# Patient Record
Sex: MALE | Race: OTHER | Employment: FULL TIME
[De-identification: names, ages, dates, MRNs, and addresses within clinical notes are randomized per-mention and may not be internally consistent; named-entity substitution may affect disease eponyms.]

---

## 2017-02-13 ENCOUNTER — OFFICE VISIT (OUTPATIENT)
Facility: CLINIC | Age: 47
End: 2017-02-13

## 2017-02-13 ENCOUNTER — HOSPITAL ENCOUNTER (OUTPATIENT)
Age: 47
Setting detail: SPECIMEN
Discharge: HOME | End: 2017-02-13
Payer: COMMERCIAL

## 2017-02-13 VITALS
WEIGHT: 196 LBS | DIASTOLIC BLOOD PRESSURE: 84 MMHG | SYSTOLIC BLOOD PRESSURE: 102 MMHG | RESPIRATION RATE: 16 BRPM | BODY MASS INDEX: 30.7 KG/M2 | HEART RATE: 66 BPM | OXYGEN SATURATION: 98 %

## 2017-02-13 DIAGNOSIS — Z00.00 PREVENTATIVE HEALTH CARE: ICD-10-CM

## 2017-02-13 DIAGNOSIS — M54.50 CHRONIC MIDLINE LOW BACK PAIN WITHOUT SCIATICA: ICD-10-CM

## 2017-02-13 DIAGNOSIS — G89.29 CHRONIC MIDLINE LOW BACK PAIN WITHOUT SCIATICA: ICD-10-CM

## 2017-02-13 DIAGNOSIS — E78.00 PURE HYPERCHOLESTEROLEMIA: ICD-10-CM

## 2017-02-13 DIAGNOSIS — K14.8 TONGUE DISCOLORATION: ICD-10-CM

## 2017-02-13 DIAGNOSIS — M10.9 ACUTE GOUT OF LEFT FOOT, UNSPECIFIED CAUSE: ICD-10-CM

## 2017-02-13 DIAGNOSIS — I95.9 HYPOTENSION, UNSPECIFIED HYPOTENSION TYPE: ICD-10-CM

## 2017-02-13 DIAGNOSIS — M1A.0720 CHRONIC GOUT OF LEFT ANKLE, UNSPECIFIED CAUSE: ICD-10-CM

## 2017-02-13 DIAGNOSIS — E78.00 PURE HYPERCHOLESTEROLEMIA: Primary | ICD-10-CM

## 2017-02-13 LAB
ALBUMIN SERPL-MCNC: 4.6 G/DL (ref 3.5–5.2)
ALBUMIN/GLOBULIN RATIO: 1.8 (ref 1–2.5)
ALP BLD-CCNC: 91 U/L (ref 40–129)
ALT SERPL-CCNC: 48 U/L (ref 5–41)
ANION GAP SERPL CALCULATED.3IONS-SCNC: 15 MMOL/L (ref 9–17)
AST SERPL-CCNC: 27 U/L
BILIRUB SERPL-MCNC: 0.78 MG/DL (ref 0.3–1.2)
BUN BLDV-MCNC: 10 MG/DL (ref 6–20)
BUN/CREAT BLD: ABNORMAL (ref 9–20)
CALCIUM SERPL-MCNC: 9.2 MG/DL (ref 8.6–10.4)
CHLORIDE BLD-SCNC: 100 MMOL/L (ref 98–107)
CHOLESTEROL/HDL RATIO: 3.2
CHOLESTEROL: 202 MG/DL
CO2: 26 MMOL/L (ref 20–31)
CREAT SERPL-MCNC: 0.84 MG/DL (ref 0.7–1.2)
GFR AFRICAN AMERICAN: >60 ML/MIN
GFR NON-AFRICAN AMERICAN: >60 ML/MIN
GFR SERPL CREATININE-BSD FRML MDRD: ABNORMAL ML/MIN/{1.73_M2}
GFR SERPL CREATININE-BSD FRML MDRD: ABNORMAL ML/MIN/{1.73_M2}
GLUCOSE BLD-MCNC: 97 MG/DL (ref 70–99)
HDLC SERPL-MCNC: 63 MG/DL
LDL CHOLESTEROL: 113 MG/DL (ref 0–130)
POTASSIUM SERPL-SCNC: 4.3 MMOL/L (ref 3.7–5.3)
SODIUM BLD-SCNC: 141 MMOL/L (ref 135–144)
TOTAL PROTEIN: 7.1 G/DL (ref 6.4–8.3)
TRIGL SERPL-MCNC: 128 MG/DL
URIC ACID: 7.5 MG/DL (ref 3.4–7)
VLDLC SERPL CALC-MCNC: ABNORMAL MG/DL (ref 1–30)

## 2017-02-13 PROCEDURE — 99214 OFFICE O/P EST MOD 30 MIN: CPT | Performed by: NURSE PRACTITIONER

## 2017-02-13 ASSESSMENT — ENCOUNTER SYMPTOMS
ALLERGIC/IMMUNOLOGIC NEGATIVE: 1
EYES NEGATIVE: 1
RESPIRATORY NEGATIVE: 1
GASTROINTESTINAL NEGATIVE: 1

## 2017-02-14 ENCOUNTER — TELEPHONE (OUTPATIENT)
Facility: CLINIC | Age: 47
End: 2017-02-14

## 2017-08-18 PROBLEM — E78.00 PURE HYPERCHOLESTEROLEMIA: Status: ACTIVE | Noted: 2017-08-18

## 2017-08-18 PROBLEM — M1A.0720 CHRONIC GOUT OF LEFT ANKLE: Status: ACTIVE | Noted: 2017-08-18

## 2017-08-22 ENCOUNTER — HOSPITAL ENCOUNTER (OUTPATIENT)
Age: 47
Setting detail: SPECIMEN
Discharge: HOME | End: 2017-08-22
Payer: COMMERCIAL

## 2017-08-22 DIAGNOSIS — E78.00 PURE HYPERCHOLESTEROLEMIA: ICD-10-CM

## 2017-08-22 DIAGNOSIS — M1A.0720 CHRONIC GOUT OF LEFT ANKLE, UNSPECIFIED CAUSE: ICD-10-CM

## 2017-08-22 LAB
ALBUMIN SERPL-MCNC: 4.3 G/DL (ref 3.5–5.2)
ALBUMIN/GLOBULIN RATIO: 1.8 (ref 1–2.5)
ALP BLD-CCNC: 74 U/L (ref 40–129)
ALT SERPL-CCNC: 31 U/L (ref 5–41)
ANION GAP SERPL CALCULATED.3IONS-SCNC: 13 MMOL/L (ref 9–17)
AST SERPL-CCNC: 25 U/L
BILIRUB SERPL-MCNC: 0.65 MG/DL (ref 0.3–1.2)
BUN BLDV-MCNC: 8 MG/DL (ref 6–20)
BUN/CREAT BLD: NORMAL (ref 9–20)
CALCIUM SERPL-MCNC: 8.7 MG/DL (ref 8.6–10.4)
CHLORIDE BLD-SCNC: 98 MMOL/L (ref 98–107)
CHOLESTEROL/HDL RATIO: 2.6
CHOLESTEROL: 154 MG/DL
CO2: 25 MMOL/L (ref 20–31)
CREAT SERPL-MCNC: 0.83 MG/DL (ref 0.7–1.2)
GFR AFRICAN AMERICAN: >60 ML/MIN
GFR NON-AFRICAN AMERICAN: >60 ML/MIN
GFR SERPL CREATININE-BSD FRML MDRD: NORMAL ML/MIN/{1.73_M2}
GFR SERPL CREATININE-BSD FRML MDRD: NORMAL ML/MIN/{1.73_M2}
GLUCOSE BLD-MCNC: 90 MG/DL (ref 70–99)
HDLC SERPL-MCNC: 60 MG/DL
LDL CHOLESTEROL: 69 MG/DL (ref 0–130)
POTASSIUM SERPL-SCNC: 4.4 MMOL/L (ref 3.7–5.3)
SODIUM BLD-SCNC: 136 MMOL/L (ref 135–144)
TOTAL PROTEIN: 6.7 G/DL (ref 6.4–8.3)
TRIGL SERPL-MCNC: 127 MG/DL
URIC ACID: 7.2 MG/DL (ref 3.4–7)
VLDLC SERPL CALC-MCNC: NORMAL MG/DL (ref 1–30)

## 2017-09-10 ENCOUNTER — APPOINTMENT (OUTPATIENT)
Dept: CT IMAGING | Age: 47
End: 2017-09-10
Payer: COMMERCIAL

## 2017-09-10 ENCOUNTER — HOSPITAL ENCOUNTER (OUTPATIENT)
Age: 47
Setting detail: OBSERVATION
Discharge: HOME | End: 2017-09-12
Attending: EMERGENCY MEDICINE | Admitting: SURGERY
Payer: COMMERCIAL

## 2017-09-10 ENCOUNTER — OFFICE VISIT (OUTPATIENT)
Dept: FAMILY MEDICINE CLINIC | Age: 47
End: 2017-09-10
Payer: COMMERCIAL

## 2017-09-10 VITALS
OXYGEN SATURATION: 100 % | BODY MASS INDEX: 30.01 KG/M2 | HEIGHT: 68 IN | TEMPERATURE: 98.1 F | WEIGHT: 198 LBS | DIASTOLIC BLOOD PRESSURE: 73 MMHG | SYSTOLIC BLOOD PRESSURE: 134 MMHG | HEART RATE: 76 BPM | RESPIRATION RATE: 20 BRPM

## 2017-09-10 DIAGNOSIS — S70.11XA THIGH HEMATOMA, RIGHT, INITIAL ENCOUNTER: Primary | ICD-10-CM

## 2017-09-10 DIAGNOSIS — S79.921A THIGH INJURY, RIGHT, INITIAL ENCOUNTER: Primary | ICD-10-CM

## 2017-09-10 LAB
ABSOLUTE EOS #: 0 K/UL (ref 0–0.4)
ABSOLUTE LYMPH #: 1.4 K/UL (ref 1–4.8)
ABSOLUTE MONO #: 0.9 K/UL (ref 0.1–1.3)
ANION GAP SERPL CALCULATED.3IONS-SCNC: 15 MMOL/L (ref 9–17)
BASOPHILS # BLD: 1 %
BASOPHILS ABSOLUTE: 0.1 K/UL (ref 0–0.2)
BUN BLDV-MCNC: 12 MG/DL (ref 6–20)
BUN/CREAT BLD: ABNORMAL (ref 9–20)
CALCIUM SERPL-MCNC: 8.7 MG/DL (ref 8.6–10.4)
CHLORIDE BLD-SCNC: 102 MMOL/L (ref 98–107)
CO2: 23 MMOL/L (ref 20–31)
CREAT SERPL-MCNC: 0.92 MG/DL (ref 0.7–1.2)
DIFFERENTIAL TYPE: ABNORMAL
EOSINOPHILS RELATIVE PERCENT: 0 %
GFR AFRICAN AMERICAN: >60 ML/MIN
GFR NON-AFRICAN AMERICAN: >60 ML/MIN
GFR SERPL CREATININE-BSD FRML MDRD: ABNORMAL ML/MIN/{1.73_M2}
GFR SERPL CREATININE-BSD FRML MDRD: ABNORMAL ML/MIN/{1.73_M2}
GLUCOSE BLD-MCNC: 123 MG/DL (ref 70–99)
HCT VFR BLD CALC: 36.4 % (ref 41–53)
HEMOGLOBIN: 12.1 G/DL (ref 13.5–17.5)
INR BLD: 1
LYMPHOCYTES # BLD: 12 %
MCH RBC QN AUTO: 28.6 PG (ref 26–34)
MCHC RBC AUTO-ENTMCNC: 33.2 G/DL (ref 31–37)
MCV RBC AUTO: 86 FL (ref 80–100)
MONOCYTES # BLD: 8 %
PARTIAL THROMBOPLASTIN TIME: 24.8 SEC (ref 23–31)
PDW BLD-RTO: 13.9 % (ref 11.5–14.9)
PLATELET # BLD: 210 K/UL (ref 150–450)
PLATELET ESTIMATE: ABNORMAL
PMV BLD AUTO: 8.4 FL (ref 6–12)
POTASSIUM SERPL-SCNC: 4.2 MMOL/L (ref 3.7–5.3)
PROTHROMBIN TIME: 11 SEC (ref 9.7–12)
RBC # BLD: 4.23 M/UL (ref 4.5–5.9)
RBC # BLD: ABNORMAL 10*6/UL
SEG NEUTROPHILS: 79 %
SEGMENTED NEUTROPHILS ABSOLUTE COUNT: 9.2 K/UL (ref 1.3–9.1)
SODIUM BLD-SCNC: 140 MMOL/L (ref 135–144)
WBC # BLD: 11.6 K/UL (ref 3.5–11)
WBC # BLD: ABNORMAL 10*3/UL

## 2017-09-10 PROCEDURE — G0378 HOSPITAL OBSERVATION PER HR: HCPCS

## 2017-09-10 PROCEDURE — 6370000000 HC RX 637 (ALT 250 FOR IP): Performed by: SURGERY

## 2017-09-10 PROCEDURE — 85025 COMPLETE CBC W/AUTO DIFF WBC: CPT

## 2017-09-10 PROCEDURE — 2580000003 HC RX 258: Performed by: SURGERY

## 2017-09-10 PROCEDURE — 2580000003 HC RX 258: Performed by: EMERGENCY MEDICINE

## 2017-09-10 PROCEDURE — 6360000004 HC RX CONTRAST MEDICATION: Performed by: EMERGENCY MEDICINE

## 2017-09-10 PROCEDURE — 6360000002 HC RX W HCPCS: Performed by: EMERGENCY MEDICINE

## 2017-09-10 PROCEDURE — 85730 THROMBOPLASTIN TIME PARTIAL: CPT

## 2017-09-10 PROCEDURE — 96376 TX/PRO/DX INJ SAME DRUG ADON: CPT

## 2017-09-10 PROCEDURE — 85610 PROTHROMBIN TIME: CPT

## 2017-09-10 PROCEDURE — 99213 OFFICE O/P EST LOW 20 MIN: CPT | Performed by: INTERNAL MEDICINE

## 2017-09-10 PROCEDURE — 36415 COLL VENOUS BLD VENIPUNCTURE: CPT

## 2017-09-10 PROCEDURE — 99284 EMERGENCY DEPT VISIT MOD MDM: CPT

## 2017-09-10 PROCEDURE — 80048 BASIC METABOLIC PNL TOTAL CA: CPT

## 2017-09-10 PROCEDURE — 73706 CT ANGIO LWR EXTR W/O&W/DYE: CPT

## 2017-09-10 PROCEDURE — 96374 THER/PROPH/DIAG INJ IV PUSH: CPT

## 2017-09-10 PROCEDURE — 6360000002 HC RX W HCPCS: Performed by: SURGERY

## 2017-09-10 RX ORDER — OXYCODONE HYDROCHLORIDE AND ACETAMINOPHEN 5; 325 MG/1; MG/1
1 TABLET ORAL EVERY 4 HOURS PRN
Status: DISCONTINUED | OUTPATIENT
Start: 2017-09-10 | End: 2017-09-12 | Stop reason: HOSPADM

## 2017-09-10 RX ORDER — OXYCODONE HYDROCHLORIDE AND ACETAMINOPHEN 5; 325 MG/1; MG/1
2 TABLET ORAL EVERY 4 HOURS PRN
Status: DISCONTINUED | OUTPATIENT
Start: 2017-09-10 | End: 2017-09-12 | Stop reason: HOSPADM

## 2017-09-10 RX ORDER — MORPHINE SULFATE 4 MG/ML
4 INJECTION, SOLUTION INTRAMUSCULAR; INTRAVENOUS ONCE
Status: COMPLETED | OUTPATIENT
Start: 2017-09-10 | End: 2017-09-10

## 2017-09-10 RX ORDER — SODIUM CHLORIDE 0.9 % (FLUSH) 0.9 %
10 SYRINGE (ML) INJECTION PRN
Status: DISCONTINUED | OUTPATIENT
Start: 2017-09-10 | End: 2017-09-12 | Stop reason: HOSPADM

## 2017-09-10 RX ORDER — MORPHINE SULFATE 4 MG/ML
4 INJECTION, SOLUTION INTRAMUSCULAR; INTRAVENOUS
Status: DISCONTINUED | OUTPATIENT
Start: 2017-09-10 | End: 2017-09-12 | Stop reason: HOSPADM

## 2017-09-10 RX ORDER — 0.9 % SODIUM CHLORIDE 0.9 %
100 INTRAVENOUS SOLUTION INTRAVENOUS ONCE
Status: COMPLETED | OUTPATIENT
Start: 2017-09-10 | End: 2017-09-10

## 2017-09-10 RX ORDER — ACETAMINOPHEN 325 MG/1
650 TABLET ORAL EVERY 4 HOURS PRN
Status: DISCONTINUED | OUTPATIENT
Start: 2017-09-10 | End: 2017-09-12 | Stop reason: HOSPADM

## 2017-09-10 RX ORDER — MORPHINE SULFATE 2 MG/ML
2 INJECTION, SOLUTION INTRAMUSCULAR; INTRAVENOUS
Status: DISCONTINUED | OUTPATIENT
Start: 2017-09-10 | End: 2017-09-12 | Stop reason: HOSPADM

## 2017-09-10 RX ORDER — SODIUM CHLORIDE 0.9 % (FLUSH) 0.9 %
10 SYRINGE (ML) INJECTION EVERY 12 HOURS SCHEDULED
Status: DISCONTINUED | OUTPATIENT
Start: 2017-09-10 | End: 2017-09-12 | Stop reason: HOSPADM

## 2017-09-10 RX ADMIN — OXYCODONE HYDROCHLORIDE AND ACETAMINOPHEN 1 TABLET: 5; 325 TABLET ORAL at 23:31

## 2017-09-10 RX ADMIN — IOVERSOL 100 ML: 741 INJECTION INTRA-ARTERIAL; INTRAVENOUS at 16:29

## 2017-09-10 RX ADMIN — Medication 10 ML: at 16:29

## 2017-09-10 RX ADMIN — MORPHINE SULFATE 4 MG: 4 INJECTION, SOLUTION INTRAMUSCULAR; INTRAVENOUS at 18:56

## 2017-09-10 RX ADMIN — MORPHINE SULFATE 4 MG: 4 INJECTION, SOLUTION INTRAMUSCULAR; INTRAVENOUS at 21:47

## 2017-09-10 RX ADMIN — MORPHINE SULFATE 4 MG: 4 INJECTION, SOLUTION INTRAMUSCULAR; INTRAVENOUS at 15:30

## 2017-09-10 RX ADMIN — Medication 10 ML: at 21:46

## 2017-09-10 RX ADMIN — SODIUM CHLORIDE 100 ML: 9 INJECTION, SOLUTION INTRAVENOUS at 16:29

## 2017-09-10 ASSESSMENT — ENCOUNTER SYMPTOMS
DIARRHEA: 0
VOMITING: 0
EYE REDNESS: 0
COUGH: 0
COLOR CHANGE: 0
CONSTIPATION: 0
BLOOD IN STOOL: 0
EYE PAIN: 0
WHEEZING: 0
NAUSEA: 0
RHINORRHEA: 0
COUGH: 0
CHEST TIGHTNESS: 0
SORE THROAT: 0
ABDOMINAL PAIN: 0
SINUS PRESSURE: 0
BACK PAIN: 0
SHORTNESS OF BREATH: 0
EYE DISCHARGE: 0
TROUBLE SWALLOWING: 0
FACIAL SWELLING: 0
SHORTNESS OF BREATH: 0

## 2017-09-10 ASSESSMENT — PAIN DESCRIPTION - LOCATION
LOCATION: LEG

## 2017-09-10 ASSESSMENT — PAIN SCALES - GENERAL
PAINLEVEL_OUTOF10: 8
PAINLEVEL_OUTOF10: 10
PAINLEVEL_OUTOF10: 10
PAINLEVEL_OUTOF10: 8

## 2017-09-10 ASSESSMENT — PAIN DESCRIPTION - FREQUENCY: FREQUENCY: CONTINUOUS

## 2017-09-10 ASSESSMENT — PAIN DESCRIPTION - DESCRIPTORS: DESCRIPTORS: PRESSURE;THROBBING

## 2017-09-10 ASSESSMENT — PAIN DESCRIPTION - ORIENTATION
ORIENTATION: RIGHT
ORIENTATION: LEFT

## 2017-09-10 ASSESSMENT — PAIN DESCRIPTION - PAIN TYPE
TYPE: ACUTE PAIN

## 2017-09-11 LAB
HCT VFR BLD CALC: 34.5 % (ref 41–53)
HCT VFR BLD CALC: 34.8 % (ref 41–53)
HCT VFR BLD CALC: 35.4 % (ref 41–53)
HCT VFR BLD CALC: 35.5 % (ref 41–53)
HEMOGLOBIN: 11.7 G/DL (ref 13.5–17.5)
HEMOGLOBIN: 11.8 G/DL (ref 13.5–17.5)
HEMOGLOBIN: 11.9 G/DL (ref 13.5–17.5)
HEMOGLOBIN: 11.9 G/DL (ref 13.5–17.5)

## 2017-09-11 PROCEDURE — 36415 COLL VENOUS BLD VENIPUNCTURE: CPT

## 2017-09-11 PROCEDURE — G0378 HOSPITAL OBSERVATION PER HR: HCPCS

## 2017-09-11 PROCEDURE — 85014 HEMATOCRIT: CPT

## 2017-09-11 PROCEDURE — 85018 HEMOGLOBIN: CPT

## 2017-09-11 PROCEDURE — 6370000000 HC RX 637 (ALT 250 FOR IP): Performed by: SURGERY

## 2017-09-11 PROCEDURE — 2580000003 HC RX 258: Performed by: SURGERY

## 2017-09-11 RX ADMIN — OXYCODONE HYDROCHLORIDE AND ACETAMINOPHEN 1 TABLET: 5; 325 TABLET ORAL at 13:12

## 2017-09-11 RX ADMIN — OXYCODONE HYDROCHLORIDE AND ACETAMINOPHEN 1 TABLET: 5; 325 TABLET ORAL at 09:12

## 2017-09-11 RX ADMIN — OXYCODONE HYDROCHLORIDE AND ACETAMINOPHEN 1 TABLET: 5; 325 TABLET ORAL at 20:25

## 2017-09-11 RX ADMIN — Medication 10 ML: at 20:25

## 2017-09-11 RX ADMIN — Medication 10 ML: at 09:13

## 2017-09-11 ASSESSMENT — PAIN SCALES - GENERAL
PAINLEVEL_OUTOF10: 1
PAINLEVEL_OUTOF10: 3
PAINLEVEL_OUTOF10: 2
PAINLEVEL_OUTOF10: 2
PAINLEVEL_OUTOF10: 6
PAINLEVEL_OUTOF10: 5
PAINLEVEL_OUTOF10: 6

## 2017-09-11 ASSESSMENT — PAIN DESCRIPTION - LOCATION
LOCATION: LEG

## 2017-09-11 ASSESSMENT — PAIN DESCRIPTION - DESCRIPTORS
DESCRIPTORS: ACHING;PRESSURE;THROBBING
DESCRIPTORS: ACHING;PRESSURE;THROBBING
DESCRIPTORS: ACHING;PRESSURE

## 2017-09-11 ASSESSMENT — PAIN DESCRIPTION - FREQUENCY
FREQUENCY: CONTINUOUS
FREQUENCY: CONTINUOUS

## 2017-09-11 ASSESSMENT — PAIN DESCRIPTION - PAIN TYPE
TYPE: ACUTE PAIN

## 2017-09-11 ASSESSMENT — PAIN DESCRIPTION - ORIENTATION
ORIENTATION: RIGHT

## 2017-09-12 VITALS
HEIGHT: 68 IN | WEIGHT: 201.28 LBS | TEMPERATURE: 98.2 F | HEART RATE: 91 BPM | SYSTOLIC BLOOD PRESSURE: 140 MMHG | OXYGEN SATURATION: 95 % | DIASTOLIC BLOOD PRESSURE: 76 MMHG | RESPIRATION RATE: 16 BRPM | BODY MASS INDEX: 30.51 KG/M2

## 2017-09-12 LAB
HCT VFR BLD CALC: 35.3 % (ref 41–53)
HCT VFR BLD CALC: 35.6 % (ref 41–53)
HEMOGLOBIN: 11.9 G/DL (ref 13.5–17.5)
HEMOGLOBIN: 12.1 G/DL (ref 13.5–17.5)

## 2017-09-12 PROCEDURE — 85014 HEMATOCRIT: CPT

## 2017-09-12 PROCEDURE — 6370000000 HC RX 637 (ALT 250 FOR IP): Performed by: SURGERY

## 2017-09-12 PROCEDURE — 85018 HEMOGLOBIN: CPT

## 2017-09-12 PROCEDURE — G0378 HOSPITAL OBSERVATION PER HR: HCPCS

## 2017-09-12 PROCEDURE — 2580000003 HC RX 258: Performed by: SURGERY

## 2017-09-12 RX ORDER — OXYCODONE HYDROCHLORIDE AND ACETAMINOPHEN 5; 325 MG/1; MG/1
1 TABLET ORAL EVERY 6 HOURS PRN
Qty: 30 TABLET | Refills: 0 | Status: SHIPPED | OUTPATIENT
Start: 2017-09-12 | End: 2017-09-19

## 2017-09-12 RX ADMIN — OXYCODONE HYDROCHLORIDE AND ACETAMINOPHEN 1 TABLET: 5; 325 TABLET ORAL at 06:17

## 2017-09-12 RX ADMIN — Medication 10 ML: at 10:07

## 2017-09-12 RX ADMIN — OXYCODONE HYDROCHLORIDE AND ACETAMINOPHEN 1 TABLET: 5; 325 TABLET ORAL at 12:37

## 2017-09-12 ASSESSMENT — PAIN DESCRIPTION - DESCRIPTORS: DESCRIPTORS: ACHING;PRESSURE

## 2017-09-12 ASSESSMENT — PAIN SCALES - GENERAL
PAINLEVEL_OUTOF10: 1
PAINLEVEL_OUTOF10: 4
PAINLEVEL_OUTOF10: 0
PAINLEVEL_OUTOF10: 0
PAINLEVEL_OUTOF10: 6

## 2017-09-12 ASSESSMENT — PAIN DESCRIPTION - LOCATION: LOCATION: LEG

## 2017-09-12 ASSESSMENT — PAIN DESCRIPTION - ORIENTATION: ORIENTATION: RIGHT

## 2017-09-12 ASSESSMENT — PAIN DESCRIPTION - PAIN TYPE: TYPE: ACUTE PAIN

## 2017-09-21 PROBLEM — M79.604 RIGHT LEG PAIN: Status: ACTIVE | Noted: 2017-09-21

## 2017-09-21 PROBLEM — S70.10XA THIGH HEMATOMA: Status: ACTIVE | Noted: 2017-09-21

## 2017-09-21 PROBLEM — M25.571 ACUTE RIGHT ANKLE PAIN: Status: ACTIVE | Noted: 2017-09-21

## 2018-03-26 PROBLEM — R74.8 ELEVATED LIVER ENZYMES: Status: ACTIVE | Noted: 2018-03-26

## 2018-04-02 ENCOUNTER — HOSPITAL ENCOUNTER (OUTPATIENT)
Age: 48
Setting detail: SPECIMEN
Discharge: HOME | End: 2018-04-02
Payer: COMMERCIAL

## 2018-04-02 DIAGNOSIS — R74.8 ELEVATED LIVER ENZYMES: ICD-10-CM

## 2018-04-02 DIAGNOSIS — M1A.0720 CHRONIC GOUT OF LEFT ANKLE, UNSPECIFIED CAUSE: ICD-10-CM

## 2018-04-02 DIAGNOSIS — E78.00 PURE HYPERCHOLESTEROLEMIA: ICD-10-CM

## 2018-04-02 LAB
ALBUMIN SERPL-MCNC: 4.3 G/DL (ref 3.5–5.2)
ALBUMIN/GLOBULIN RATIO: 1.7 (ref 1–2.5)
ALP BLD-CCNC: 83 U/L (ref 40–129)
ALT SERPL-CCNC: 39 U/L (ref 5–41)
ANION GAP SERPL CALCULATED.3IONS-SCNC: 10 MMOL/L (ref 9–17)
AST SERPL-CCNC: 25 U/L
BILIRUB SERPL-MCNC: 0.68 MG/DL (ref 0.3–1.2)
BUN BLDV-MCNC: 11 MG/DL (ref 6–20)
BUN/CREAT BLD: ABNORMAL (ref 9–20)
CALCIUM SERPL-MCNC: 9.2 MG/DL (ref 8.6–10.4)
CHLORIDE BLD-SCNC: 107 MMOL/L (ref 98–107)
CHOLESTEROL/HDL RATIO: 2.7
CHOLESTEROL: 178 MG/DL
CO2: 27 MMOL/L (ref 20–31)
CREAT SERPL-MCNC: 0.84 MG/DL (ref 0.7–1.2)
GFR AFRICAN AMERICAN: >60 ML/MIN
GFR NON-AFRICAN AMERICAN: >60 ML/MIN
GFR SERPL CREATININE-BSD FRML MDRD: ABNORMAL ML/MIN/{1.73_M2}
GFR SERPL CREATININE-BSD FRML MDRD: ABNORMAL ML/MIN/{1.73_M2}
GLUCOSE BLD-MCNC: 102 MG/DL (ref 70–99)
HDLC SERPL-MCNC: 67 MG/DL
LDL CHOLESTEROL: 86 MG/DL (ref 0–130)
POTASSIUM SERPL-SCNC: 4.6 MMOL/L (ref 3.7–5.3)
SODIUM BLD-SCNC: 144 MMOL/L (ref 135–144)
TOTAL PROTEIN: 6.9 G/DL (ref 6.4–8.3)
TRIGL SERPL-MCNC: 124 MG/DL
URIC ACID: 7.8 MG/DL (ref 3.4–7)
VLDLC SERPL CALC-MCNC: NORMAL MG/DL (ref 1–30)

## 2018-09-20 ENCOUNTER — HOSPITAL ENCOUNTER (OUTPATIENT)
Age: 48
Setting detail: SPECIMEN
Discharge: HOME | End: 2018-09-20
Payer: COMMERCIAL

## 2018-09-20 LAB
ALBUMIN SERPL-MCNC: 4.3 G/DL (ref 3.5–5.2)
ALBUMIN/GLOBULIN RATIO: 1.8 (ref 1–2.5)
ALP BLD-CCNC: 78 U/L (ref 40–129)
ALT SERPL-CCNC: 28 U/L (ref 5–41)
ANION GAP SERPL CALCULATED.3IONS-SCNC: 11 MMOL/L (ref 9–17)
AST SERPL-CCNC: 24 U/L
BILIRUB SERPL-MCNC: 0.65 MG/DL (ref 0.3–1.2)
BUN BLDV-MCNC: 10 MG/DL (ref 6–20)
BUN/CREAT BLD: NORMAL (ref 9–20)
CALCIUM SERPL-MCNC: 9.2 MG/DL (ref 8.6–10.4)
CHLORIDE BLD-SCNC: 103 MMOL/L (ref 98–107)
CHOLESTEROL, FASTING: 191 MG/DL
CHOLESTEROL/HDL RATIO: 2.9
CO2: 27 MMOL/L (ref 20–31)
CREAT SERPL-MCNC: 0.88 MG/DL (ref 0.7–1.2)
GFR AFRICAN AMERICAN: >60 ML/MIN
GFR NON-AFRICAN AMERICAN: >60 ML/MIN
GFR SERPL CREATININE-BSD FRML MDRD: NORMAL ML/MIN/{1.73_M2}
GFR SERPL CREATININE-BSD FRML MDRD: NORMAL ML/MIN/{1.73_M2}
GLUCOSE BLD-MCNC: 97 MG/DL (ref 70–99)
HCT VFR BLD CALC: 40.8 % (ref 40.7–50.3)
HDLC SERPL-MCNC: 66 MG/DL
HEMOGLOBIN: 13.2 G/DL (ref 13–17)
LDL CHOLESTEROL: 107 MG/DL (ref 0–130)
MCH RBC QN AUTO: 27.3 PG (ref 25.2–33.5)
MCHC RBC AUTO-ENTMCNC: 32.4 G/DL (ref 28.4–34.8)
MCV RBC AUTO: 84.5 FL (ref 82.6–102.9)
NRBC AUTOMATED: 0 PER 100 WBC
PDW BLD-RTO: 15.7 % (ref 11.8–14.4)
PLATELET # BLD: 258 K/UL (ref 138–453)
PMV BLD AUTO: 11.1 FL (ref 8.1–13.5)
POTASSIUM SERPL-SCNC: 4.8 MMOL/L (ref 3.7–5.3)
RBC # BLD: 4.83 M/UL (ref 4.21–5.77)
SODIUM BLD-SCNC: 141 MMOL/L (ref 135–144)
TOTAL PROTEIN: 6.7 G/DL (ref 6.4–8.3)
TRIGLYCERIDE, FASTING: 91 MG/DL
URIC ACID: 7.3 MG/DL (ref 3.4–7)
VLDLC SERPL CALC-MCNC: NORMAL MG/DL (ref 1–30)
WBC # BLD: 5.2 K/UL (ref 3.5–11.3)

## 2018-09-26 PROBLEM — Z00.00 PREVENTATIVE HEALTH CARE: Status: RESOLVED | Noted: 2017-02-13 | Resolved: 2018-09-26

## 2019-04-24 PROBLEM — Z12.11 SCREEN FOR COLON CANCER: Status: ACTIVE | Noted: 2019-04-24

## 2019-04-24 PROBLEM — J34.2 DEVIATED SEPTUM: Status: ACTIVE | Noted: 2019-04-24

## 2019-04-24 PROBLEM — Z13.1 SCREENING FOR DIABETES MELLITUS (DM): Status: ACTIVE | Noted: 2017-02-13

## 2019-04-24 PROBLEM — Z12.5 SCREENING FOR PROSTATE CANCER: Status: ACTIVE | Noted: 2017-02-13

## 2019-04-24 PROBLEM — J32.9 CHRONIC SINUSITIS: Status: ACTIVE | Noted: 2019-04-24

## 2019-04-25 ENCOUNTER — HOSPITAL ENCOUNTER (OUTPATIENT)
Age: 49
Setting detail: SPECIMEN
Discharge: HOME | End: 2019-04-25
Payer: COMMERCIAL

## 2019-04-25 DIAGNOSIS — Z12.5 SCREENING FOR PROSTATE CANCER: ICD-10-CM

## 2019-04-25 DIAGNOSIS — Z13.1 SCREENING FOR DIABETES MELLITUS (DM): ICD-10-CM

## 2019-04-25 DIAGNOSIS — M1A.0720 CHRONIC GOUT OF LEFT ANKLE, UNSPECIFIED CAUSE: ICD-10-CM

## 2019-04-25 DIAGNOSIS — E78.00 PURE HYPERCHOLESTEROLEMIA: ICD-10-CM

## 2019-04-25 LAB
ALBUMIN SERPL-MCNC: 4.5 G/DL (ref 3.5–5.2)
ALBUMIN/GLOBULIN RATIO: 1.9 (ref 1–2.5)
ALP BLD-CCNC: 74 U/L (ref 40–129)
ALT SERPL-CCNC: 35 U/L (ref 5–41)
ANION GAP SERPL CALCULATED.3IONS-SCNC: 15 MMOL/L (ref 9–17)
AST SERPL-CCNC: 25 U/L
BILIRUB SERPL-MCNC: 0.83 MG/DL (ref 0.3–1.2)
BUN BLDV-MCNC: 11 MG/DL (ref 6–20)
BUN/CREAT BLD: NORMAL (ref 9–20)
CALCIUM SERPL-MCNC: 9.1 MG/DL (ref 8.6–10.4)
CHLORIDE BLD-SCNC: 107 MMOL/L (ref 98–107)
CHOLESTEROL/HDL RATIO: 2.9
CHOLESTEROL: 172 MG/DL
CO2: 22 MMOL/L (ref 20–31)
CREAT SERPL-MCNC: 0.75 MG/DL (ref 0.7–1.2)
ESTIMATED AVERAGE GLUCOSE: 117 MG/DL
GFR AFRICAN AMERICAN: >60 ML/MIN
GFR NON-AFRICAN AMERICAN: >60 ML/MIN
GFR SERPL CREATININE-BSD FRML MDRD: NORMAL ML/MIN/{1.73_M2}
GFR SERPL CREATININE-BSD FRML MDRD: NORMAL ML/MIN/{1.73_M2}
GLUCOSE BLD-MCNC: 84 MG/DL (ref 70–99)
HBA1C MFR BLD: 5.7 % (ref 4–6)
HDLC SERPL-MCNC: 59 MG/DL
LDL CHOLESTEROL: 94 MG/DL (ref 0–130)
POTASSIUM SERPL-SCNC: 4.3 MMOL/L (ref 3.7–5.3)
PROSTATE SPECIFIC ANTIGEN: 3.53 UG/L
SODIUM BLD-SCNC: 144 MMOL/L (ref 135–144)
TOTAL PROTEIN: 6.9 G/DL (ref 6.4–8.3)
TRIGL SERPL-MCNC: 97 MG/DL
URIC ACID: 7.8 MG/DL (ref 3.4–7)
VLDLC SERPL CALC-MCNC: NORMAL MG/DL (ref 1–30)

## 2019-05-24 PROBLEM — Z12.5 SCREENING FOR PROSTATE CANCER: Status: RESOLVED | Noted: 2017-02-13 | Resolved: 2019-05-24

## 2019-05-24 PROBLEM — Z12.11 SCREEN FOR COLON CANCER: Status: RESOLVED | Noted: 2019-04-24 | Resolved: 2019-05-24

## 2019-07-13 ENCOUNTER — APPOINTMENT (OUTPATIENT)
Dept: GENERAL RADIOLOGY | Age: 49
End: 2019-07-13
Payer: COMMERCIAL

## 2019-07-13 ENCOUNTER — HOSPITAL ENCOUNTER (EMERGENCY)
Age: 49
Discharge: HOME | End: 2019-07-13
Attending: EMERGENCY MEDICINE
Payer: COMMERCIAL

## 2019-07-13 VITALS
SYSTOLIC BLOOD PRESSURE: 132 MMHG | BODY MASS INDEX: 28.04 KG/M2 | WEIGHT: 185 LBS | HEART RATE: 113 BPM | DIASTOLIC BLOOD PRESSURE: 88 MMHG | RESPIRATION RATE: 16 BRPM | TEMPERATURE: 97.8 F | HEIGHT: 68 IN | OXYGEN SATURATION: 97 %

## 2019-07-13 DIAGNOSIS — S63.259A DISLOCATION OF FINGER, INITIAL ENCOUNTER: ICD-10-CM
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
## 2019-07-13 PROCEDURE — 2500000003 HC RX 250 WO HCPCS: Performed by: NURSE PRACTITIONER

## 2019-07-13 PROCEDURE — 90471 IMMUNIZATION ADMIN: CPT | Performed by: NURSE PRACTITIONER

## 2019-07-13 PROCEDURE — 96372 THER/PROPH/DIAG INJ SC/IM: CPT

## 2019-07-13 PROCEDURE — 12002 RPR S/N/AX/GEN/TRNK2.6-7.5CM: CPT

## 2019-07-13 PROCEDURE — 73130 X-RAY EXAM OF HAND: CPT

## 2019-07-13 PROCEDURE — 90715 TDAP VACCINE 7 YRS/> IM: CPT | Performed by: NURSE PRACTITIONER

## 2019-07-13 PROCEDURE — 6360000002 HC RX W HCPCS: Performed by: NURSE PRACTITIONER

## 2019-07-13 PROCEDURE — 6370000000 HC RX 637 (ALT 250 FOR IP): Performed by: NURSE PRACTITIONER

## 2019-07-13 PROCEDURE — 26770 TREAT FINGER DISLOCATION: CPT

## 2019-07-13 PROCEDURE — 99283 EMERGENCY DEPT VISIT LOW MDM: CPT

## 2019-07-13 PROCEDURE — 73090 X-RAY EXAM OF FOREARM: CPT

## 2019-07-13 PROCEDURE — 12032 INTMD RPR S/A/T/EXT 2.6-7.5: CPT

## 2019-07-13 RX ORDER — LIDOCAINE HYDROCHLORIDE 10 MG/ML
20 INJECTION, SOLUTION INFILTRATION; PERINEURAL ONCE
Status: COMPLETED | OUTPATIENT
Start: 2019-07-13 | End: 2019-07-13

## 2019-07-13 RX ORDER — GINSENG 100 MG
CAPSULE ORAL
Qty: 1 TUBE | Refills: 3 | Status: SHIPPED | OUTPATIENT
Start: 2019-07-13 | End: 2019-07-23

## 2019-07-13 RX ORDER — HYDROCODONE BITARTRATE AND ACETAMINOPHEN 5; 325 MG/1; MG/1
1 TABLET ORAL EVERY 8 HOURS PRN
Qty: 10 TABLET | Refills: 0 | Status: SHIPPED | OUTPATIENT
Start: 2019-07-13 | End: 2019-07-17

## 2019-07-13 RX ORDER — MORPHINE SULFATE 4 MG/ML
4 INJECTION, SOLUTION INTRAMUSCULAR; INTRAVENOUS ONCE
Status: COMPLETED | OUTPATIENT
Start: 2019-07-13 | End: 2019-07-13

## 2019-07-13 RX ORDER — AMOXICILLIN AND CLAVULANATE POTASSIUM 875; 125 MG/1; MG/1
1 TABLET, FILM COATED ORAL ONCE
Status: COMPLETED | OUTPATIENT
Start: 2019-07-13 | End: 2019-07-13

## 2019-07-13 RX ORDER — AMOXICILLIN AND CLAVULANATE POTASSIUM 875; 125 MG/1; MG/1
1 TABLET, FILM COATED ORAL 2 TIMES DAILY
Qty: 14 TABLET | Refills: 0 | Status: SHIPPED | OUTPATIENT
Start: 2019-07-13 | End: 2019-07-19 | Stop reason: SDUPTHER

## 2019-07-13 RX ADMIN — TETANUS TOXOID, REDUCED DIPHTHERIA TOXOID AND ACELLULAR PERTUSSIS VACCINE, ADSORBED 0.5 ML: 5; 2.5; 8; 8; 2.5 SUSPENSION INTRAMUSCULAR at 11:27

## 2019-07-13 RX ADMIN — AMOXICILLIN AND CLAVULANATE POTASSIUM 1 TABLET: 875; 125 TABLET, FILM COATED ORAL at 11:28

## 2019-07-13 RX ADMIN — MORPHINE SULFATE 4 MG: 4 INJECTION INTRAVENOUS at 11:28

## 2019-07-13 RX ADMIN — LIDOCAINE HYDROCHLORIDE 20 ML: 10 INJECTION, SOLUTION INFILTRATION; PERINEURAL at 13:08

## 2019-07-13 ASSESSMENT — ENCOUNTER SYMPTOMS
NAUSEA: 0
COUGH: 0
VOMITING: 0
TROUBLE SWALLOWING: 0
SHORTNESS OF BREATH: 0

## 2019-07-13 ASSESSMENT — PAIN SCALES - GENERAL
PAINLEVEL_OUTOF10: 7
PAINLEVEL_OUTOF10: 5
PAINLEVEL_OUTOF10: 5

## 2019-07-13 NOTE — ED PROVIDER NOTES
[address]  eMERGENCYdEPARTMENT eNCOUnter      Pt Name: Arturo Tom  MRN: [id number]  [place] 1970  Date of evaluation: 7/13/2019  Provider:ALEXANDRE ALSTON CNP    CHIEF COMPLAINT       Chief Complaint   Patient presents with    Animal Bite     Dog bite to bilat arms and hands         HISTORY OF PRESENT ILLNESS  (Location/Symptom, Timing/Onset, Context/Setting, Quality, Duration, Modifying Factors, Severity.)   Arturo Tom is a 51 y.o. male who presents to the emergency department with complaints of dog bite. Patient states he was trying to break up his own dogs from fighting. States that he was bitten in both hands and forearms. Reports multiple dog bite wounds both hands and forearms. States he cannot extend his right pinky finger. His tetanus shot is not up-to-date. Relates that dogs are vaccinated. No other injuries. Nursing Notes were reviewed and I agree. REVIEW OF SYSTEMS    (2-9 systems for level 4,10 or more for level 5)      Review of Systems   Constitutional: Negative for chills and fever. HENT: Negative for trouble swallowing. Respiratory: Negative for cough and shortness of breath. Cardiovascular: Negative for chest pain and palpitations. Gastrointestinal: Negative for nausea and vomiting. Musculoskeletal:        Dog bite to both arms and hands     Except as noted above the remainder of the review of systems was reviewed andnegative. PAST MEDICAL HISTORY         Diagnosis Date    Gout 2004    Hyperlipidemia      Reviewed. SURGICAL HISTORY           Procedure Laterality Date    TONSILLECTOMY       Reviewed.   CURRENT MEDICATIONS       Discharge Medication List as of 7/13/2019  1:30 PM      CONTINUE these medications which have NOT CHANGED    Details   atorvastatin (LIPITOR) 40 MG tablet Take 1 tablet by mouth daily, Disp-30 tablet, R-11Normal      fluticasone (FLONASE) 50 MCG/ACT nasal spray 2 sprays by Nasal route daily, Disp-1 Bottle, R-1Normal Omega-3 Fatty Acids (FISH OIL PO) Take 1,200 mg by mouth             ALLERGIES     Patient has no known allergies. FAMILY HISTORY           Problem Relation Age of Onset    High Blood Pressure Mother     High Cholesterol Father     High Blood Pressure Father     Diabetes Father     Other Maternal Grandmother         gout     Cancer Maternal Grandfather     Alzheimer's Disease Paternal Grandmother     Dementia Paternal Grandmother     Diabetes Paternal Grandfather     Heart Disease Paternal Grandfather      Family Status   Relation Name Status    Mother  Alive    Father  Alive    MGM  Alive    MGF      [address]      PGF        Reviewed and not relevant. SOCIAL HISTORY      reports that he has quit smoking. He has never used smokeless tobacco. He reports that he drinks alcohol. He reports that he does not use drugs. Reviewed. PHYSICAL EXAM    (up to 7 for level 4, 8 or more for level 5)     ED Triage Vitals [19 1035]   BP Temp Temp Source Pulse Resp SpO2 Height Weight   132/88 97.8 °F (36.6 °C) Oral 113 16 97 % 5' 8\" (1.727 m) 185 lb (83.9 kg)       Physical Exam   Constitutional: He is oriented to person, place, and time. He appears well-developed and well-nourished. No distress. HENT:   Head: Normocephalic and atraumatic. Right Ear: External ear normal.   Left Ear: External ear normal.   Nose: Nose normal.   Eyes: Right eye exhibits no discharge. Left eye exhibits no discharge. No scleral icterus. Neck: Normal range of motion. No tracheal deviation present. Pulmonary/Chest: Effort normal. No stridor. No respiratory distress. Musculoskeletal: Normal range of motion. He exhibits no edema. Neurological: He is alert and oriented to person, place, and time. Coordination normal.   Skin: Skin is warm and dry. He is not diaphoretic. Dog bite wounds to dorsal and volar forearm. Multiple dog bite wounds to right hand, including fingers.   Right pinky finger in HAND 7/13/2019 11:30 am COMPARISON: None HISTORY: ORDERING SYSTEM PROVIDED HISTORY: dog bite TECHNOLOGIST PROVIDED HISTORY: dog bite Reason for Exam: dog bite Acuity: Acute Type of Exam: Initial FINDINGS: RIGHT FOREARM:  No acute fracture. Joints maintain anatomic alignment. Suspected soft tissue swelling in the posterior distal forearm. No evident skin defect, soft tissue gas, nor radiopaque foreign body. LEFT FOREARM:  No acute fracture. Joints maintain anatomic alignment. Soft tissue swelling and gas in the anterior proximal forearm. No evident skin defect nor radiopaque foreign body. RIGHT HAND:  Anterior subluxation or dislocation of the 5th middle phalanx at the 5th proximal interphalangeal joint. Joints otherwise maintain anatomic alignment. No evident acute fracture. Soft tissue swelling in the posterior distal forearm. No evident skin defect, soft tissue gas, nor radiopaque foreign body. LEFT HAND:  No acute fracture. Joints maintain anatomic alignment. No evident skin defect, soft tissue gas, nor radiopaque foreign body. 1. Anterior subluxation or dislocation of the right 5th middle phalanx with no evident associated fracture. 2. Soft tissue swelling in the posterior distal right forearm and soft tissue swelling and gas in anterior proximal left forearm likely related to the reported dog bite injuries. No underlying fractures nor radiopaque foreign bodies. 3. No acute findings in the left hand. Xr Hand Right (min 3 Views)    Result Date: 7/13/2019  EXAMINATION: 2 XRAY VIEWS OF THE RIGHT FOREARM; 2 XRAY VIEWS OF THE LEFT FOREARM; 3 XRAY VIEWS OF THE RIGHT HAND; 3 XRAY VIEWS OF THE LEFT HAND 7/13/2019 11:30 am COMPARISON: None HISTORY: ORDERING SYSTEM PROVIDED HISTORY: dog bite TECHNOLOGIST PROVIDED HISTORY: dog bite Reason for Exam: dog bite Acuity: Acute Type of Exam: Initial FINDINGS: RIGHT FOREARM:  No acute fracture. Joints maintain anatomic alignment.  Suspected soft tissue swelling TempSrc: Oral   SpO2: 97%   Weight: 185 lb (83.9 kg)   Height: 5' 8\" (1.727 m)         Vitals reviewed. PROCEDURES:  Lac Repair  Date/Time: 7/13/2019 2:57 PM  Performed by: ALEXANDRE Ellison CNP  Authorized by: Taran Arrieta MD     Consent:     Consent obtained:  Verbal    Consent given by:  Patient    Risks discussed:  Infection, pain, retained foreign body and poor wound healing    Alternatives discussed:  No treatment  Anesthesia (see MAR for exact dosages): Anesthesia method:  Local infiltration    Local anesthetic:  Lidocaine 1% w/o epi  Laceration details:     Location:  Shoulder/arm    Shoulder/arm location:  R lower arm    Length (cm):  3  Repair type:     Repair type: Intermediate  Pre-procedure details:     Preparation:  Patient was prepped and draped in usual sterile fashion and imaging obtained to evaluate for foreign bodies  Exploration:     Wound exploration: wound explored through full range of motion and entire depth of wound probed and visualized    Treatment:     Area cleansed with:  Saline and Betadine    Amount of cleaning:  Standard    Irrigation solution:  Sterile saline    Irrigation method:  Pressure wash  Skin repair:     Repair method:  Sutures    Suture size:  5-0    Suture material:  Nylon    Suture technique:  Simple interrupted    Number of sutures:  1  Approximation:     Approximation:  Loose  Post-procedure details:     Dressing:  Antibiotic ointment and non-adherent dressing    Patient tolerance of procedure: Tolerated well, no immediate complications  Lac Repair  Date/Time: 7/13/2019 2:58 PM  Performed by: ALEXANDRE Ellison CNP  Authorized by: Taran Arrieta MD     Consent:     Consent obtained:  Verbal    Consent given by:  Patient    Risks discussed:  Pain, poor wound healing, infection and retained foreign body    Alternatives discussed:  No treatment  Anesthesia (see MAR for exact dosages):      Anesthesia method:  Local infiltration    Local

## 2019-07-13 NOTE — ED PROVIDER NOTES
[address] ED  eMERGENCY dEPARTMENT eNCOUnter   Attending Attestation     Pt Name: Sofya Lamb  MRN: [id number]  [place] 1970  Date of evaluation: 7/13/19    History, EXAM, MDM:    Sofya Lamb is a 51 y.o. male who presents with Animal Bite (Dog bite to bilat arms and hands)        Vitals:   Vitals:    07/13/19 1035   BP: 132/88   Pulse: 113   Resp: 16   Temp: 97.8 °F (36.6 °C)   TempSrc: Oral   SpO2: 97%   Weight: 185 lb (83.9 kg)   Height: 5' 8\" (1.727 m)       I performed a history and physical examination of the patient and discussed management with the APC. I reviewed the APC note and agree with the documented findings and plan of care. Any areas of disagreement are noted on the chart. I was personally present for the key portions of any procedures. I have documented in the chart those procedures where I was not present during the key portions. I have personally reviewed all images and agree with the resident's interpretation. I have reviewed the emergency nurses triage note. I agree with the chief complaint, past medical history, past surgical history, allergies, medications, social and family history as documented unless otherwise noted below. Documentation of the HPI, Physical Exam and Medical Decision Making performed by medical students or scribes is based on my personal performance of the HPI, PE and MDM. For Phys Assistant/ Nurse Practitioner cases/documentation I have had a face to face evaluation of this patient and have completed at least one if not all key elements of the E/M (history, physical exam, and MDM). Additional findings are as noted.     Todd Vu MD  Attending Emergency  Physician              Todd Vu MD  07/13/19 9023       Todd Vu MD  07/13/19 0491

## 2019-07-16 ENCOUNTER — HOSPITAL ENCOUNTER (OUTPATIENT)
Dept: GENERAL RADIOLOGY | Age: 49
Discharge: HOME | End: 2019-07-18
Payer: COMMERCIAL

## 2019-07-16 ENCOUNTER — HOSPITAL ENCOUNTER (OUTPATIENT)
Age: 49
Discharge: HOME | End: 2019-07-18
Payer: COMMERCIAL

## 2019-07-16 DIAGNOSIS — T14.8XXA DISLOCATION CLOSED: ICD-10-CM

## 2019-07-16 PROCEDURE — 73140 X-RAY EXAM OF FINGER(S): CPT

## 2019-09-27 ENCOUNTER — OFFICE VISIT (OUTPATIENT)
Dept: UROLOGY | Age: 49
End: 2019-09-27
Payer: COMMERCIAL

## 2019-09-27 VITALS
HEART RATE: 76 BPM | SYSTOLIC BLOOD PRESSURE: 153 MMHG | HEIGHT: 68 IN | TEMPERATURE: 97.4 F | DIASTOLIC BLOOD PRESSURE: 86 MMHG | BODY MASS INDEX: 27.58 KG/M2 | WEIGHT: 182 LBS

## 2019-09-27 DIAGNOSIS — Z12.5 PROSTATE CANCER SCREENING: Primary | ICD-10-CM

## 2019-09-27 PROCEDURE — 99204 OFFICE O/P NEW MOD 45 MIN: CPT | Performed by: UROLOGY

## 2019-09-27 ASSESSMENT — ENCOUNTER SYMPTOMS
WHEEZING: 0
DIARRHEA: 0
BACK PAIN: 0
ABDOMINAL PAIN: 0
SHORTNESS OF BREATH: 0
VOMITING: 0
EYE PAIN: 0
NAUSEA: 0
EYE REDNESS: 0
CONSTIPATION: 0
COUGH: 0

## 2019-09-27 NOTE — PROGRESS NOTES
[facility]  [facility] - [address]  Dept: [phone]  Dept Fax: [phone] [facility] Urology Office Note - New patient    Patient:  Denisse De Leon  YOB: 1970  Date: 9/27/2019    The patient is a 47 y.o. male who presents todayfor evaluation of the following problems:   Chief Complaint   Patient presents with    New Patient     prostate exam     referred by ALEXANDRE Rosenberg CNP. HPI  He is here for prostate cancer screening. He has minimal voiding complaints. He has no family history of prostate cancer. He had an issue voiding when he was in high school. He feels like he has a weak stream.  He used to bike, but does not anymore. No hematuria, no dysuria. He was found to have a PSA of 3.53. He does not restrict fluids before. (Patient's old records have been requested, reviewed and summarized in today's note.)    Summary of old records: N/A    Additional History: N/A    Procedures Today: N/A    Last several PSA's:  Lab Results   Component Value Date    PSA 3.53 04/25/2019     Last total testosterone:  No results found for: TESTOSTERONE  Urinalysis today:  No results found for this visit on 09/27/19.     AUA Symptom Score (9/27/2019):  INCOMPLETE EMPTYING: How often have you had the sensation of not emptying your bladder?: Not at all  FREQUENCY: How often do you have to urinate less than every two hours?: Not at all  INTERMITTENCY: How often have you found you stopped and started again several times when you urinated?: Not at all  URGENCY: How often have you found it difficult to postpone urination?: Not at all  WEAK STREAM: How often have you had a weak urinary stream?: Not at all  STRAINING: How often have you had to strain to start  urination?: Not at all  NOCTURIA: How many times did you typically get up at night to uriniate?: 1 Time  TOTAL I-PSS SCORE[de-identified] 1       Last BUN and creatinine:  Lab Results normal  Cardiovascular: Warm & Pink  Abdomen: Soft, non-tender, non-distendedwith no CVA,  No flank tenderness,  Orhepatosplenomegaly   Lymphatics: No palpable lymphadenopathy. Bladder non-tender and not distended. Musculoskeletal: Normal gait and station  Penis normal and circumcised  Urethral meatus normal  Scrotal exam normal  Testicles normal bilaterally  Epididymis normal bilaterally  No evidence of inguinal hernia  Normal rectal tone with no masses  Prostate:  40 grams, smooth, no nodules. Assessment and Plan      1. Prostate cancer screening           Plan:        Doing well  No voiding complaints. PSA is slightly elevated for someone his age. DEMIAN shows BPH. Would re-check PSA in 6 weeks. Prescriptions Ordered:  No orders of the defined types were placed in this encounter. Orders Placed:  Orders Placed This Encounter   Procedures    PSA, Diagnostic     Standing Status:   Future     Standing Expiration Date:   9/26/2020             Yimi Matamoros MD    Agree with the ROS entered by the MA.

## 2019-10-24 ENCOUNTER — HOSPITAL ENCOUNTER (OUTPATIENT)
Age: 49
Setting detail: SPECIMEN
Discharge: HOME | End: 2019-10-24
Payer: COMMERCIAL

## 2019-10-24 DIAGNOSIS — Z12.5 PROSTATE CANCER SCREENING: ICD-10-CM

## 2019-10-24 LAB — PROSTATE SPECIFIC ANTIGEN: 2.18 UG/L

## 2019-11-05 ENCOUNTER — TELEPHONE (OUTPATIENT)
Dept: UROLOGY | Age: 49
End: 2019-11-05

## 2019-11-21 ENCOUNTER — OFFICE VISIT (OUTPATIENT)
Dept: UROLOGY | Age: 49
End: 2019-11-21
Payer: COMMERCIAL

## 2019-11-21 VITALS
TEMPERATURE: 98.3 F | SYSTOLIC BLOOD PRESSURE: 110 MMHG | BODY MASS INDEX: 28.92 KG/M2 | DIASTOLIC BLOOD PRESSURE: 80 MMHG | WEIGHT: 190.8 LBS | HEIGHT: 68 IN

## 2019-11-21 DIAGNOSIS — Z12.5 PROSTATE CANCER SCREENING: Primary | ICD-10-CM

## 2019-11-21 PROCEDURE — 99212 OFFICE O/P EST SF 10 MIN: CPT | Performed by: UROLOGY

## 2019-11-21 ASSESSMENT — ENCOUNTER SYMPTOMS
NAUSEA: 0
SHORTNESS OF BREATH: 0
ABDOMINAL PAIN: 0
CONSTIPATION: 0
DIARRHEA: 0
EYE REDNESS: 0
COUGH: 0
BACK PAIN: 0
WHEEZING: 0
EYE PAIN: 0
VOMITING: 0

## 2020-04-24 ENCOUNTER — TELEPHONE (OUTPATIENT)
Dept: FAMILY MEDICINE CLINIC | Age: 50
End: 2020-04-24

## 2020-05-20 ENCOUNTER — HOSPITAL ENCOUNTER (OUTPATIENT)
Age: 50
Setting detail: SPECIMEN
Discharge: HOME | End: 2020-05-20
Payer: COMMERCIAL

## 2020-05-20 LAB
CHOLESTEROL/HDL RATIO: 2.7
CHOLESTEROL: 114 MG/DL
HDLC SERPL-MCNC: 43 MG/DL
LDL CHOLESTEROL: 57 MG/DL (ref 0–130)
PROSTATE SPECIFIC ANTIGEN: 3.52 UG/L
TRIGL SERPL-MCNC: 69 MG/DL
VLDLC SERPL CALC-MCNC: NORMAL MG/DL (ref 1–30)

## 2020-05-21 LAB
ESTIMATED AVERAGE GLUCOSE: 117 MG/DL
HBA1C MFR BLD: 5.7 % (ref 4–6)

## 2020-05-26 ENCOUNTER — VIRTUAL VISIT (OUTPATIENT)
Dept: UROLOGY | Age: 50
End: 2020-05-26
Payer: COMMERCIAL

## 2020-05-26 PROCEDURE — 99213 OFFICE O/P EST LOW 20 MIN: CPT | Performed by: UROLOGY

## 2020-05-26 ASSESSMENT — ENCOUNTER SYMPTOMS
NAUSEA: 0
EYE PAIN: 0
COUGH: 0
ABDOMINAL PAIN: 0
VOMITING: 0
WHEEZING: 0
BACK PAIN: 0
EYE REDNESS: 0
SHORTNESS OF BREATH: 0
COLOR CHANGE: 0

## 2020-05-26 NOTE — PROGRESS NOTES
[facility]  [facility] - [facility]  [address] [facility] [address]  Dept:  [facility] Urology Office Note - Established    Patient:  Cheo Sotomayor  YOB: 1970  Date: 5/26/2020    The patient is a 47 y.o. male who presents todayfor evaluation of the following problems:   Chief Complaint   Patient presents with    Elevated PSA     6 mo f/u with PSA       HPI  He is following up for elevated PSA. He had a PSA of 3.5. It then came back down to 2.18, and now it is back up to 3.5. He is voiding well without any issues. No family history of prostate cancer. His dad had an elevated PSA, which resulted in a biopsy and that was negative. He has a mildly enlarged gland for his age. He has no voiding complaints at this time. Summary of old records: N/A    Additional History: N/A    Procedures Today: N/A    Urinalysis today:  No results found for this visit on 05/26/20.   Last several PSA's:  Lab Results   Component Value Date    PSA 3.52 05/20/2020    PSA 2.18 10/24/2019    PSA 3.53 04/25/2019     Last total testosterone:  No results found for: TESTOSTERONE    AUA Symptom Score (5/26/2020):                               Last BUN and creatinine:  Lab Results   Component Value Date    BUN 11 04/25/2019     Lab Results   Component Value Date    CREATININE 0.75 04/25/2019       Additional Lab/Culture results: none    Imaging Reviewed during this Office Visit: none    (results were independently reviewed by physician and radiology report verified)    PAST MEDICAL, FAMILY AND SOCIAL HISTORY UPDATE:  Past Medical History:   Diagnosis Date    Gout 2004    Hyperlipidemia      Past Surgical History:   Procedure Laterality Date    TONSILLECTOMY       Family History   Problem Relation Age of Onset    High Blood Pressure Mother     High Cholesterol Father     High Blood Pressure Father     Diabetes Father     Other Maternal Grandmother         gout     Cancer Maternal Grandfather     Alzheimer's Disease Paternal Grandmother     Dementia Paternal Grandmother     Diabetes Paternal Grandfather     Heart Disease Paternal Grandfather      Outpatient Medications Marked as Taking for the 5/26/20 encounter (Virtual Visit) with Carlos Wellington MD   Medication Sig Dispense Refill    atorvastatin (LIPITOR) 40 MG tablet Take 1 tablet by mouth daily 30 tablet 11       Patient has no known allergies. Social History     Tobacco Use   Smoking Status Former Smoker   Smokeless Tobacco Never Used     (Ifpatient a smoker, smoking cessation counseling offered)    Social History     Substance and Sexual Activity   Alcohol Use Yes    Comment: weekends       REVIEW OF SYSTEMS:  Review of Systems    Physical Exam:    There were no vitals filed for this visit. There is no height or weight on file to calculate BMI. Patient is a 47 y.o. male in no acute distress and alert and oriented to person, place and time. Physical Exam  Constitutional: Patient in no acute distress. Neuro: Alert and oriented to person, place and time. Psych: Mood normal, affect normal  Skin: No rash noted  HEENT: Head: Normocephalic andatraumatic    Lungs: Respiratory effort is normal  Cardiovascular: Warm & Pink  Abdomen: Soft, non-tender, non-distended with no CVA,  No flank tenderness,  Or hepatosplenomegaly   Lymphatics: No palpablelymphadenopathy. Assessment and Plan      1. Prostate cancer screening    2. BPH without obstruction/lower urinary tract symptoms           Plan:          No voiding complaints. PSA has gone back up. Discussed biopsy vs observation. Will follow with PSA in 6 months. Return in about 6 months (around 11/26/2020). Prescriptions Ordered:  No orders of the defined types were placed in this encounter. Orders Placed:  No orders of the defined types were placed in this encounter. Carlos Wellington MD    Agree with the ROS entered by the MA.

## 2020-11-20 ENCOUNTER — HOSPITAL ENCOUNTER (OUTPATIENT)
Age: 50
Setting detail: SPECIMEN
Discharge: HOME | End: 2020-11-20
Payer: COMMERCIAL

## 2020-11-20 LAB — PROSTATE SPECIFIC ANTIGEN: 1.25 UG/L

## 2020-11-22 ENCOUNTER — OFFICE VISIT (OUTPATIENT)
Dept: PRIMARY CARE CLINIC | Age: 50
End: 2020-11-22
Payer: COMMERCIAL

## 2020-11-22 ENCOUNTER — HOSPITAL ENCOUNTER (OUTPATIENT)
Age: 50
Setting detail: SPECIMEN
Discharge: HOME | End: 2020-11-22
Payer: COMMERCIAL

## 2020-11-22 VITALS
TEMPERATURE: 99.1 F | HEART RATE: 97 BPM | OXYGEN SATURATION: 97 % | BODY MASS INDEX: 28.79 KG/M2 | HEIGHT: 68 IN | WEIGHT: 190 LBS

## 2020-11-22 PROCEDURE — 99213 OFFICE O/P EST LOW 20 MIN: CPT | Performed by: FAMILY MEDICINE

## 2020-11-22 ASSESSMENT — PATIENT HEALTH QUESTIONNAIRE - PHQ9
1. LITTLE INTEREST OR PLEASURE IN DOING THINGS: 0
2. FEELING DOWN, DEPRESSED OR HOPELESS: 0
SUM OF ALL RESPONSES TO PHQ QUESTIONS 1-9: 0
SUM OF ALL RESPONSES TO PHQ9 QUESTIONS 1 & 2: 0
SUM OF ALL RESPONSES TO PHQ QUESTIONS 1-9: 0
SUM OF ALL RESPONSES TO PHQ QUESTIONS 1-9: 0

## 2020-11-22 ASSESSMENT — ENCOUNTER SYMPTOMS
SORE THROAT: 1
ABDOMINAL PAIN: 0
COUGH: 1
WHEEZING: 0
NAUSEA: 0
VOMITING: 0
RHINORRHEA: 1
SHORTNESS OF BREATH: 0
DIARRHEA: 0

## 2020-11-22 NOTE — PATIENT INSTRUCTIONS
You were tested for COVID today. We will call you with results when they are available. If you have not heard from us in 7 days, please call our office. Advance Care Planning  People with COVID-19 may have no symptoms, mild symptoms, such as fever, cough, and shortness of breath or they may have more severe illness, developing severe and fatal pneumonia. As a result, Advance Care Planning with attention to naming a health care decision maker (someone you trust to make healthcare decisions for you if you could not speak for yourself) and sharing other health care preferences is important BEFORE a possible health crisis. Please contact your Primary Care Provider to discuss Advance Care Planning. Preventing the Spread of Coronavirus Disease 2019 in Homes and Residential Communities  For the most recent information go to [email or web address]    Prevention steps for People with confirmed or suspected COVID-19 (including persons under investigation) who do not need to be hospitalized  and   People with confirmed COVID-19 who were hospitalized and determined to be medically stable to go home    Your healthcare provider and public health staff will evaluate whether you can be cared for at home. If it is determined that you do not need to be hospitalized and can be isolated at home, you will be monitored by staff from your local or state health department. You should follow the prevention steps below until a healthcare provider or local or state health department says you can return to your normal activities. Stay home except to get medical care  People who are mildly ill with COVID-19 are able to isolate at home during their illness. You should restrict activities outside your home, except for getting medical care. Do not go to work, school, or public areas. Avoid using public transportation, ride-sharing, or taxis.   Separate yourself from other people and local health department or occupational health professionals, as appropriate. When working with your local health department check their available hours. If you have a medical emergency and need to call 911, notify the dispatch personnel that you have, or are being evaluated for COVID-19. If possible, put on a facemask before emergency medical services arrive. Discontinuing home isolation  Patients with confirmed COVID-19 should remain under home isolation precautions until the risk of secondary transmission to others is thought to be low. The decision to discontinue home isolation precautions should be made on a case-by-case basis, in consultation with healthcare providers and state and local health departments.

## 2020-11-22 NOTE — PROGRESS NOTES
[address]  [address] [address]  Dept: [phone]  Dept Fax: [phone]    Sara Denver is a 45 y.o. male who presents today for his medical conditions/complaintsas noted below. Sara Denver is c/o of Concern For COVID-19 (cough, drainage, loss of taste and sore throat,X2 DAYS, POSITIVE EXPOSURE TO 2 FRIENDS)        HPI:     Cough   This is a new problem. The current episode started in the past 7 days (2 days). The problem has been unchanged. The problem occurs constantly. The cough is non-productive. Associated symptoms include ear pain, nasal congestion, rhinorrhea and a sore throat. Pertinent negatives include no chills, fever, myalgias, rash, shortness of breath or wheezing. Associated symptoms comments: Loss of taste. Nothing aggravates the symptoms. Treatments tried: just finished amoxicillin which he was taking for ear infection. The treatment provided mild relief. There is no history of asthma, COPD or environmental allergies.    No significant PMHx  Reports exposure to people who later tested positive for COVID about 1 week ago  Past Medical History:   Diagnosis Date    Gout 2004    Hyperlipidemia     Past medical history reviewed and pertinent positives/negatives in the HPI    Past Surgical History:   Procedure Laterality Date    TONSILLECTOMY         Family History   Problem Relation Age of Onset    High Blood Pressure Mother     High Cholesterol Father     High Blood Pressure Father     Diabetes Father     Other Maternal Grandmother         gout     Cancer Maternal Grandfather     Alzheimer's Disease Paternal Grandmother     Dementia Paternal Grandmother     Diabetes Paternal Grandfather     Heart Disease Paternal Grandfather        Social History     Tobacco Use    Smoking status: Former Smoker    Smokeless tobacco: Never Used   Substance Use Topics    Alcohol use: Yes     Comment: weekends      Current Outpatient Medications   Medication Sig Dispense Refill    ibuprofen (ADVIL;MOTRIN) 800 MG tablet Take 1 tablet by mouth every 8 hours as needed for Pain 30 tablet 0    atorvastatin (LIPITOR) 40 MG tablet Take 1 tablet by mouth daily 30 tablet 11    predniSONE (DELTASONE) 20 MG tablet I po bid x 4 then I po daily x 3 days (Patient not taking: Reported on 11/17/2020) 11 tablet 0     No current facility-administered medications for this visit. No Known Allergies    Health Maintenance   Topic Date Due    Shingles Vaccine (1 of 2) 07/23/2019    Colon cancer screen colonoscopy  07/23/2019    Flu vaccine (1) 09/01/2020    HIV screen  08/08/2026 (Originally 7/23/1984)    A1C test (Diabetic or Prediabetic)  05/20/2021    Lipid screen  05/20/2021    DTaP/Tdap/Td vaccine (2 - Td) 07/13/2029    Hepatitis A vaccine  Aged Out    Hepatitis B vaccine  Aged Out    Hib vaccine  Aged Out    Meningococcal (ACWY) vaccine  Aged Out    Pneumococcal 0-64 years Vaccine  Aged Out       :      Review of Systems   Constitutional: Negative for chills and fever. HENT: Positive for congestion, ear pain, rhinorrhea and sore throat. Respiratory: Positive for cough. Negative for shortness of breath and wheezing. Gastrointestinal: Negative for abdominal pain, diarrhea, nausea and vomiting. Musculoskeletal: Negative for myalgias. Skin: Negative for pallor and rash. Allergic/Immunologic: Negative for environmental allergies. Hematological: Negative for adenopathy. Objective:     Physical Exam  Vitals signs and nursing note reviewed. Constitutional:       Appearance: Normal appearance. HENT:      Head: Normocephalic and atraumatic. Right Ear: Hearing, ear canal and external ear normal. A middle ear effusion is present. Left Ear: Hearing, tympanic membrane, ear canal and external ear normal.      Nose: Nose normal.      Mouth/Throat:      Lips: Pink.       Mouth: Mucous membranes are moist.      Pharynx: Posterior oropharyngeal erythema present. No pharyngeal swelling. Tonsils: No tonsillar exudate. Eyes:      Extraocular Movements: Extraocular movements intact. Conjunctiva/sclera: Conjunctivae normal.   Neck:      Musculoskeletal: Normal range of motion. Cardiovascular:      Rate and Rhythm: Normal rate and regular rhythm. Heart sounds: Normal heart sounds. Pulmonary:      Effort: Pulmonary effort is normal.      Breath sounds: Normal breath sounds. Skin:     General: Skin is warm and dry. Neurological:      Mental Status: He is alert and oriented to person, place, and time. Mental status is at baseline. Psychiatric:         Mood and Affect: Mood normal.         Behavior: Behavior normal.         Thought Content: Thought content normal.         Judgment: Judgment normal.       Pulse 97   Temp 99.1 °F (37.3 °C) (Infrared)   Ht 5' 8\" (1.727 m)   Wt 190 lb (86.2 kg)   SpO2 97%   BMI 28.89 kg/m²     Assessment:       Diagnosis Orders   1. Suspected COVID-19 virus infection  COVID-19 Ambulatory   2. Viral illness         Plan: You were tested for COVID today. We will call you with results when they are available. If you have not heard from us in 7 days, please call our office. Advance Care Planning  People with COVID-19 may have no symptoms, mild symptoms, such as fever, cough, and shortness of breath or they may have more severe illness, developing severe and fatal pneumonia. As a result, Advance Care Planning with attention to naming a health care decision maker (someone you trust to make healthcare decisions for you if you could not speak for yourself) and sharing other health care preferences is important BEFORE a possible health crisis. Please contact your Primary Care Provider to discuss Advance Care Planning.     Preventing the Spread of Coronavirus Disease 2019 in Homes and Residential Communities  For the most recent information go to [employer]    Prevention steps for People with confirmed or suspected COVID-19 (including persons under investigation) who do not need to be hospitalized  and   People with confirmed COVID-19 who were hospitalized and determined to be medically stable to go home    Your healthcare provider and public health staff will evaluate whether you can be cared for at home. If it is determined that you do not need to be hospitalized and can be isolated at home, you will be monitored by staff from your local or state health department. You should follow the prevention steps below until a healthcare provider or local or state health department says you can return to your normal activities. Stay home except to get medical care  People who are mildly ill with COVID-19 are able to isolate at home during their illness. You should restrict activities outside your home, except for getting medical care. Do not go to work, school, or public areas. Avoid using public transportation, ride-sharing, or taxis. Separate yourself from other people and animals in your home  People: As much as possible, you should stay in a specific room and away from other people in your home. Also, you should use a separate bathroom, if available. Animals: You should restrict contact with pets and other animals while you are sick with COVID-19, just like you would around other people. Although there have not been reports of pets or other animals becoming sick with COVID-19, it is still recommended that people sick with COVID-19 limit contact with animals until more information is known about the virus. When possible, have another member of your household care for your animals while you are sick. If you are sick with COVID-19, avoid contact with your pet, including petting, snuggling, being kissed or licked, and sharing food.  If you must care for your pet or be around animals while you are sick, wash your hands before and after you interact with pets and wear a facemask. Call ahead before visiting your doctor  If you have a medical appointment, call the healthcare provider and tell them that you have or may have COVID-19. This will help the healthcare providers office take steps to keep other people from getting infected or exposed. Wear a facemask  You should wear a facemask when you are around other people (e.g., sharing a room or vehicle) or pets and before you enter a healthcare providers office. If you are not able to wear a facemask (for example, because it causes trouble breathing), then people who live with you should not stay in the same room with you, or they should wear a facemask if they enter your room. Cover your coughs and sneezes  Cover your mouth and nose with a tissue when you cough or sneeze. Throw used tissues in a lined trash can. Immediately wash your hands with soap and water for at least 20 seconds or, if soap and water are not available, clean your hands with an alcohol-based hand  that contains at least 60% alcohol. Clean your hands often  Wash your hands often with soap and water for at least 20 seconds, especially after blowing your nose, coughing, or sneezing; going to the bathroom; and before eating or preparing food. If soap and water are not readily available, use an alcohol-based hand  with at least 60% alcohol, covering all surfaces of your hands and rubbing them together until they feel dry. Soap and water are the best option if hands are visibly dirty. Avoid touching your eyes, nose, and mouth with unwashed hands. Avoid sharing personal household items  You should not share dishes, drinking glasses, cups, eating utensils, towels, or bedding with other people or pets in your home. After using these items, they should be washed thoroughly with soap and water.   Clean all high-touch surfaces everyday  High touch surfaces include counters, tabletops, doorknobs, bathroom fixtures, toilets, phones, keyboards, tablets, and bedside tables. Also, clean any surfaces that may have blood, stool, or body fluids on them. Use a household cleaning spray or wipe, according to the label instructions. Labels contain instructions for safe and effective use of the cleaning product including precautions you should take when applying the product, such as wearing gloves and making sure you have good ventilation during use of the product. Monitor your symptoms  Seek prompt medical attention if your illness is worsening (e.g., difficulty breathing). Before seeking care, call your healthcare provider and tell them that you have, or are being evaluated for, COVID-19. Put on a facemask before you enter the facility. These steps will help the healthcare providers office to keep other people in the office or waiting room from getting infected or exposed. Ask your healthcare provider to call the local or state health department. Persons who are placed under active monitoring or facilitated self-monitoring should follow instructions provided by their local health department or occupational health professionals, as appropriate. When working with your local health department check their available hours. If you have a medical emergency and need to call 911, notify the dispatch personnel that you have, or are being evaluated for COVID-19. If possible, put on a facemask before emergency medical services arrive. Discontinuing home isolation  Patients with confirmed COVID-19 should remain under home isolation precautions until the risk of secondary transmission to others is thought to be low. The decision to discontinue home isolation precautions should be made on a case-by-case basis, in consultation with healthcare providers and state and local health departments.           Orders Placed This Encounter   Procedures    COVID-19 Ambulatory     Oropharyngeal     Standing Status:

## 2020-11-25 LAB — SARS-COV-2, NAA: DETECTED

## 2020-11-26 ENCOUNTER — TELEPHONE (OUTPATIENT)
Dept: FAMILY MEDICINE CLINIC | Age: 50
End: 2020-11-26

## 2020-11-26 NOTE — TELEPHONE ENCOUNTER
LM for patient with positive results and to remain quarantined for 10 days from the start of symptoms. Advise to call 911 or go to ER for any worsening SOB or trouble breathing.  Advised that the health department will be in contact with them

## 2020-11-27 ENCOUNTER — TELEPHONE (OUTPATIENT)
Dept: UROLOGY | Age: 50
End: 2020-11-27

## 2021-07-21 ENCOUNTER — HOSPITAL ENCOUNTER (OUTPATIENT)
Age: 51
Setting detail: SPECIMEN
Discharge: HOME | End: 2021-07-21
Payer: COMMERCIAL

## 2021-07-21 DIAGNOSIS — E78.00 PURE HYPERCHOLESTEROLEMIA: ICD-10-CM

## 2021-07-21 DIAGNOSIS — R74.8 ELEVATED LIVER ENZYMES: ICD-10-CM

## 2021-07-21 LAB
HCT VFR BLD CALC: 42.4 % (ref 40.7–50.3)
HEMOGLOBIN: 12.4 G/DL (ref 13–17)
MCH RBC QN AUTO: 21.3 PG (ref 25.2–33.5)
MCHC RBC AUTO-ENTMCNC: 29.2 G/DL (ref 28.4–34.8)
MCV RBC AUTO: 73 FL (ref 82.6–102.9)
NRBC AUTOMATED: 0 PER 100 WBC
PDW BLD-RTO: 21.2 % (ref 11.8–14.4)
PLATELET # BLD: 394 K/UL (ref 138–453)
PMV BLD AUTO: 10.2 FL (ref 8.1–13.5)
RBC # BLD: 5.81 M/UL (ref 4.21–5.77)
WBC # BLD: 7.4 K/UL (ref 3.5–11.3)

## 2021-07-22 LAB
ALBUMIN SERPL-MCNC: 4.3 G/DL (ref 3.5–5.2)
ALBUMIN/GLOBULIN RATIO: 1.6 (ref 1–2.5)
ALP BLD-CCNC: 82 U/L (ref 40–129)
ALT SERPL-CCNC: 34 U/L (ref 5–41)
ANION GAP SERPL CALCULATED.3IONS-SCNC: 17 MMOL/L (ref 9–17)
AST SERPL-CCNC: 27 U/L
BILIRUB SERPL-MCNC: 0.5 MG/DL (ref 0.3–1.2)
BUN BLDV-MCNC: 8 MG/DL (ref 6–20)
BUN/CREAT BLD: NORMAL (ref 9–20)
CALCIUM SERPL-MCNC: 9.1 MG/DL (ref 8.6–10.4)
CHLORIDE BLD-SCNC: 104 MMOL/L (ref 98–107)
CHOLESTEROL/HDL RATIO: 2.4
CHOLESTEROL: 114 MG/DL
CO2: 21 MMOL/L (ref 20–31)
CREAT SERPL-MCNC: 1.1 MG/DL (ref 0.7–1.2)
GFR AFRICAN AMERICAN: >60 ML/MIN
GFR NON-AFRICAN AMERICAN: >60 ML/MIN
GFR SERPL CREATININE-BSD FRML MDRD: NORMAL ML/MIN/{1.73_M2}
GFR SERPL CREATININE-BSD FRML MDRD: NORMAL ML/MIN/{1.73_M2}
GLUCOSE BLD-MCNC: 89 MG/DL (ref 70–99)
HDLC SERPL-MCNC: 47 MG/DL
LDL CHOLESTEROL: 54 MG/DL (ref 0–130)
POTASSIUM SERPL-SCNC: 5 MMOL/L (ref 3.7–5.3)
SODIUM BLD-SCNC: 142 MMOL/L (ref 135–144)
TOTAL PROTEIN: 7 G/DL (ref 6.4–8.3)
TRIGL SERPL-MCNC: 66 MG/DL
VLDLC SERPL CALC-MCNC: NORMAL MG/DL (ref 1–30)

## 2021-11-17 ENCOUNTER — NURSE TRIAGE (OUTPATIENT)
Dept: OTHER | Facility: CLINIC | Age: 51
End: 2021-11-17

## 2021-11-17 NOTE — TELEPHONE ENCOUNTER
Reason for Disposition   MILD pain that comes and goes (cramps) lasts > 24 hours    Answer Assessment - Initial Assessment Questions  1. LOCATION: \"Where does it hurt? \"       Lower abdominal pain     2. RADIATION: \"Does the pain shoot anywhere else? \" (e.g., chest, back)      Sometimes in testicles and end of penis. 3. ONSET: \"When did the pain begin? \" (Minutes, hours or days ago)       Yesterday around 12:00     4. SUDDEN: \"Gradual or sudden onset? \"       Sudden sharp pains    5. PATTERN \"Does the pain come and go, or is it constant? \"     - If constant: \"Is it getting better, staying the same, or worsening? \"       (Note: Constant means the pain never goes away completely; most serious pain is constant and it progresses)      - If intermittent: \"How long does it last?\" \"Do you have pain now? \"      (Note: Intermittent means the pain goes away completely between bouts)      Comes and goes, pain level fluctuates. 6. SEVERITY: \"How bad is the pain? \"  (e.g., Scale 1-10; mild, moderate, or severe)     - MILD (1-3): doesn't interfere with normal activities, abdomen soft and not tender to touch      - MODERATE (4-7): interferes with normal activities or awakens from sleep, tender to touch      - SEVERE (8-10): excruciating pain, doubled over, unable to do any normal activities        6/10, feels like he needs to have a BM. Last BM 30 minutes go, possible constipation     7. RECURRENT SYMPTOM: \"Have you ever had this type of abdominal pain before? \" If so, ask: \"When was the last time? \" and \"What happened that time? \"       Yes, 2-3 months ago. 8. CAUSE: \"What do you think is causing the abdominal pain? \"      Urinary frequency    9. RELIEVING/AGGRAVATING FACTORS: \"What makes it better or worse? \" (e.g., movement, antacids, bowel movement)      Nothing helps. 10. OTHER SYMPTOMS: \"Has there been any vomiting, diarrhea, constipation, or urine problems? \"        Possible constipation, states he does have blood in bowel movements at times and has talked to the MD about this. Protocols used: ABDOMINAL PAIN - MALE-ADULT-OH    Received call from Rich Valentin at [facility] with [employer]. Brief description of triage: lower abdominal pain that radiates into testicles and head of penis. Urinary frequency. Triage indicates for patient to be seen today. Caller states he is traveling and can't get in until Thursday or Friday. I recommended UCC or ER if he is able to go today at any point. Care advice provided, patient verbalizes understanding; denies any other questions or concerns; instructed to call back for any new or worsening symptoms. Writer provided warm transfer to Maureen at [facility] for appointment scheduling. Attention Provider: Thank you for allowing me to participate in the care of your patient. The patient was connected to triage in response to information provided to the ECC/PSC. Please do not respond through this encounter as the response is not directed to a shared pool.

## 2021-11-17 NOTE — TELEPHONE ENCOUNTER
Offer tomorrow at 2 PM.  Sick call only as an add-on. If he is not able to make that time I would recommend he go to the emergency room for treatment once he is back in the [facility] area.

## 2021-11-17 NOTE — TELEPHONE ENCOUNTER
Spoke to patient states that he left his work conference in [facility] early due to the discomfort. Can I schedule patient tomorrow or Friday in a fill?

## 2021-11-18 ENCOUNTER — HOSPITAL ENCOUNTER (OUTPATIENT)
Facility: CLINIC | Age: 51
Discharge: HOME | End: 2021-11-20
Payer: COMMERCIAL

## 2021-11-18 ENCOUNTER — HOSPITAL ENCOUNTER (OUTPATIENT)
Age: 51
Setting detail: SPECIMEN
Discharge: HOME | End: 2021-11-18

## 2021-11-18 ENCOUNTER — HOSPITAL ENCOUNTER (OUTPATIENT)
Dept: GENERAL RADIOLOGY | Facility: CLINIC | Age: 51
Discharge: HOME | End: 2021-11-20
Payer: COMMERCIAL

## 2021-11-18 DIAGNOSIS — R10.31 RLQ ABDOMINAL PAIN: ICD-10-CM

## 2021-11-18 PROCEDURE — 74018 RADEX ABDOMEN 1 VIEW: CPT

## 2021-11-20 LAB
CULTURE: NO GROWTH
Lab: NORMAL
SPECIMEN DESCRIPTION: NORMAL

## 2021-11-23 ENCOUNTER — HOSPITAL ENCOUNTER (OUTPATIENT)
Age: 51
Setting detail: SPECIMEN
Discharge: HOME | End: 2021-11-23

## 2021-11-23 DIAGNOSIS — Z13.9 SCREENING FOR CONDITION: ICD-10-CM

## 2021-11-23 LAB — PROSTATE SPECIFIC ANTIGEN: 1.72 UG/L

## 2021-11-24 LAB
ESTIMATED AVERAGE GLUCOSE: 120 MG/DL
HBA1C MFR BLD: 5.8 % (ref 4–6)

## 2021-12-21 ENCOUNTER — TELEPHONE (OUTPATIENT)
Dept: GASTROENTEROLOGY | Age: 51
End: 2021-12-21

## 2021-12-21 DIAGNOSIS — R19.5 POSITIVE COLORECTAL CANCER SCREENING USING COLOGUARD TEST: Primary | ICD-10-CM

## 2021-12-21 RX ORDER — BISACODYL 5 MG
TABLET, DELAYED RELEASE (ENTERIC COATED) ORAL
Qty: 4 TABLET | Refills: 0 | Status: SHIPPED | OUTPATIENT
Start: 2021-12-21 | End: 2022-03-24

## 2021-12-21 RX ORDER — POLYETHYLENE GLYCOL 3350 17 G/17G
POWDER, FOR SOLUTION ORAL
Qty: 238 G | Refills: 0 | Status: SHIPPED | OUTPATIENT
Start: 2021-12-21 | End: 2022-03-24

## 2021-12-21 NOTE — TELEPHONE ENCOUNTER
Pt called office to schedule colon per referral. Colon screen questionnaire completed and pt ok to schedule w/o ov. Pt scheduledSTA colon [facility] Wed 1/19/22 @ 830am miralax-cm- vaccinated. Reviewed bowel prep instructions with patient over phone and mailed to home address and sent via [employer].

## 2021-12-27 NOTE — TELEPHONE ENCOUNTER
Pt called office to r/s colon to sooner date. Pt stating his brother had colon scheduled for Thurs 12/30/21 with Dr Beatriz Bonilla that was cancelled d/t testing positive for covid and pt wants to see if he can r/s to that date. Pt now rescheduled [facility] [place] Thurs 12/30/21 @ 145pm miralax-cm- vaccinated PAT 12/28 @ 330p. Reviewed bowel prep instructions with patient over the phone and emailed to [email or web address]    Per Shant Ramirez with [facility] scheduling pt does not need to have covid test prior to procedure. Pt advised if he becomes symptomatic he will need to call our office and inform us.

## 2021-12-29 ENCOUNTER — HOSPITAL ENCOUNTER (OUTPATIENT)
Dept: PREADMISSION TESTING | Age: 51
Discharge: HOME | End: 2022-01-02
Payer: COMMERCIAL

## 2021-12-29 ENCOUNTER — ANESTHESIA EVENT (OUTPATIENT)
Dept: ENDOSCOPY | Age: 51
End: 2021-12-29
Payer: COMMERCIAL

## 2021-12-29 VITALS — WEIGHT: 189 LBS | HEIGHT: 68 IN | BODY MASS INDEX: 28.64 KG/M2

## 2021-12-30 ENCOUNTER — HOSPITAL ENCOUNTER (OUTPATIENT)
Age: 51
Setting detail: OUTPATIENT SURGERY
Discharge: HOME | End: 2021-12-30
Attending: INTERNAL MEDICINE | Admitting: INTERNAL MEDICINE
Payer: COMMERCIAL

## 2021-12-30 ENCOUNTER — ANESTHESIA (OUTPATIENT)
Dept: ENDOSCOPY | Age: 51
End: 2021-12-30
Payer: COMMERCIAL

## 2021-12-30 VITALS
BODY MASS INDEX: 28.64 KG/M2 | DIASTOLIC BLOOD PRESSURE: 77 MMHG | OXYGEN SATURATION: 98 % | RESPIRATION RATE: 18 BRPM | HEART RATE: 89 BPM | WEIGHT: 189 LBS | TEMPERATURE: 97.3 F | SYSTOLIC BLOOD PRESSURE: 133 MMHG | HEIGHT: 68 IN

## 2021-12-30 VITALS
SYSTOLIC BLOOD PRESSURE: 121 MMHG | DIASTOLIC BLOOD PRESSURE: 73 MMHG | RESPIRATION RATE: 28 BRPM | OXYGEN SATURATION: 100 %

## 2021-12-30 PROCEDURE — 7100000011 HC PHASE II RECOVERY - ADDTL 15 MIN: Performed by: INTERNAL MEDICINE

## 2021-12-30 PROCEDURE — 2720000010 HC SURG SUPPLY STERILE: Performed by: INTERNAL MEDICINE

## 2021-12-30 PROCEDURE — 88305 TISSUE EXAM BY PATHOLOGIST: CPT

## 2021-12-30 PROCEDURE — 7100000010 HC PHASE II RECOVERY - FIRST 15 MIN: Performed by: INTERNAL MEDICINE

## 2021-12-30 PROCEDURE — 3700000000 HC ANESTHESIA ATTENDED CARE: Performed by: INTERNAL MEDICINE

## 2021-12-30 PROCEDURE — 2580000003 HC RX 258: Performed by: ANESTHESIOLOGY

## 2021-12-30 PROCEDURE — 2709999900 HC NON-CHARGEABLE SUPPLY: Performed by: INTERNAL MEDICINE

## 2021-12-30 PROCEDURE — 45385 COLONOSCOPY W/LESION REMOVAL: CPT | Performed by: INTERNAL MEDICINE

## 2021-12-30 PROCEDURE — 6360000002 HC RX W HCPCS: Performed by: NURSE ANESTHETIST, CERTIFIED REGISTERED

## 2021-12-30 PROCEDURE — 3700000001 HC ADD 15 MINUTES (ANESTHESIA): Performed by: INTERNAL MEDICINE

## 2021-12-30 PROCEDURE — 45381 COLONOSCOPY SUBMUCOUS NJX: CPT | Performed by: INTERNAL MEDICINE

## 2021-12-30 PROCEDURE — 3609010600 HC COLONOSCOPY POLYPECTOMY SNARE/COLD BIOPSY: Performed by: INTERNAL MEDICINE

## 2021-12-30 RX ORDER — SODIUM CHLORIDE, SODIUM LACTATE, POTASSIUM CHLORIDE, CALCIUM CHLORIDE 600; 310; 30; 20 MG/100ML; MG/100ML; MG/100ML; MG/100ML
INJECTION, SOLUTION INTRAVENOUS CONTINUOUS
Status: DISCONTINUED | OUTPATIENT
Start: 2021-12-30 | End: 2021-12-30 | Stop reason: HOSPADM

## 2021-12-30 RX ORDER — PROPOFOL 10 MG/ML
INJECTION, EMULSION INTRAVENOUS PRN
Status: DISCONTINUED | OUTPATIENT
Start: 2021-12-30 | End: 2021-12-30 | Stop reason: SDUPTHER

## 2021-12-30 RX ORDER — ONDANSETRON 2 MG/ML
4 INJECTION INTRAMUSCULAR; INTRAVENOUS
Status: DISCONTINUED | OUTPATIENT
Start: 2021-12-30 | End: 2021-12-30 | Stop reason: HOSPADM

## 2021-12-30 RX ORDER — OXYCODONE HYDROCHLORIDE AND ACETAMINOPHEN 5; 325 MG/1; MG/1
2 TABLET ORAL PRN
Status: DISCONTINUED | OUTPATIENT
Start: 2021-12-30 | End: 2021-12-30 | Stop reason: HOSPADM

## 2021-12-30 RX ORDER — EPINEPHRINE 1 MG/ML(1)
AMPUL (ML) INJECTION PRN
Status: DISCONTINUED | OUTPATIENT
Start: 2021-12-30 | End: 2021-12-30 | Stop reason: ALTCHOICE

## 2021-12-30 RX ORDER — LIDOCAINE HYDROCHLORIDE 10 MG/ML
1 INJECTION, SOLUTION EPIDURAL; INFILTRATION; INTRACAUDAL; PERINEURAL
Status: DISCONTINUED | OUTPATIENT
Start: 2021-12-30 | End: 2021-12-30 | Stop reason: HOSPADM

## 2021-12-30 RX ORDER — OXYCODONE HYDROCHLORIDE AND ACETAMINOPHEN 5; 325 MG/1; MG/1
1 TABLET ORAL PRN
Status: DISCONTINUED | OUTPATIENT
Start: 2021-12-30 | End: 2021-12-30 | Stop reason: HOSPADM

## 2021-12-30 RX ORDER — LABETALOL HYDROCHLORIDE 5 MG/ML
5 INJECTION, SOLUTION INTRAVENOUS EVERY 10 MIN PRN
Status: DISCONTINUED | OUTPATIENT
Start: 2021-12-30 | End: 2021-12-30 | Stop reason: HOSPADM

## 2021-12-30 RX ORDER — DIPHENHYDRAMINE HYDROCHLORIDE 50 MG/ML
12.5 INJECTION INTRAMUSCULAR; INTRAVENOUS
Status: DISCONTINUED | OUTPATIENT
Start: 2021-12-30 | End: 2021-12-30 | Stop reason: HOSPADM

## 2021-12-30 RX ADMIN — SODIUM CHLORIDE, POTASSIUM CHLORIDE, SODIUM LACTATE AND CALCIUM CHLORIDE: 600; 310; 30; 20 INJECTION, SOLUTION INTRAVENOUS at 13:50

## 2021-12-30 RX ADMIN — SODIUM CHLORIDE, POTASSIUM CHLORIDE, SODIUM LACTATE AND CALCIUM CHLORIDE: 600; 310; 30; 20 INJECTION, SOLUTION INTRAVENOUS at 11:36

## 2021-12-30 RX ADMIN — PROPOFOL 800 MG: 10 INJECTION, EMULSION INTRAVENOUS at 12:56

## 2021-12-30 ASSESSMENT — PULMONARY FUNCTION TESTS
PIF_VALUE: 1

## 2021-12-30 ASSESSMENT — ENCOUNTER SYMPTOMS
GASTROINTESTINAL NEGATIVE: 1
RESPIRATORY NEGATIVE: 1

## 2021-12-30 ASSESSMENT — PAIN - FUNCTIONAL ASSESSMENT: PAIN_FUNCTIONAL_ASSESSMENT: 0-10

## 2021-12-30 NOTE — H&P
HISTORY and [address]       NAME:  Tyron Mcmillan  MRN: [id number]   YOB: 1970   Date: 12/30/2021   Age: 45 y.o. Gender: male       COMPLAINT AND PRESENT HISTORY:   Tyron Mcmillan is 45 y.o.,   male, having a Diagnostic Colonoscopy. No  Prior Colonoscopy was done before. Pre diagnosis: POSITIVE COLOGUARD  HPI:  Patient denies any  FH of Colon Cancer. Pt had  positive collogued test done last month. Patient reports no changes in bowel habits. No constipation or diarrhea, pt has hemorrhoid and he notice some blood on the tissue when he wipe himself. No experiencing red/ black/ BRBPR stools. Patient has a history of lower abd pain, last months and he was treated for UTI. He was on Cipro for 4 days. Patient denies any Dysphagia. Pt has hx of GERD , and he is on Antacid PRN,  that is helping to control symptoms. No nausea / vomiting, no abdominal bloating, no weight loss. Pt denies fever/chills, chest pain or SOB. Review of additional significant medical hx:  Hyperlipidemia  Medication r/t condition : Lipitor   Sleep apnea:  pt is not using CPAP machine       NPO status: pt NPO since the  Past midnight  Medications taken TODAY (with sip of water): no am mediation   Anticoagulation status: none  Prep fully completed: YES. Pt reports his BM is clear liquid today   Denies personal hx of blood clots. Denies personal hx of MRSA infection. Denies any personal or family hx of previous complications w/anesthesia.       Lab Results   Component Value Date    WBC 7.4 07/21/2021    HGB 12.4 (L) 07/21/2021    HCT 42.4 07/21/2021    MCV 73.0 (L) 07/21/2021     07/21/2021     Lab Results   Component Value Date     07/21/2021    K 5.0 07/21/2021     07/21/2021    CO2 21 07/21/2021    BUN 8 07/21/2021    CREATININE 1.10 07/21/2021    GLUCOSE 89 07/21/2021    CALCIUM 9.1 07/21/2021        PAST MEDICAL HISTORY     Past Medical History:   Diagnosis Date  Gout 2004    Hyperlipidemia     Sleep apnea     has cpap       SURGICAL HISTORY       Past Surgical History:   Procedure Laterality Date    TONSILLECTOMY         FAMILY HISTORY       Family History   Problem Relation Age of Onset    High Blood Pressure Mother     High Cholesterol Father     High Blood Pressure Father     Diabetes Father     Other Maternal Grandmother         gout     Cancer Maternal Grandfather     Alzheimer's Disease Paternal Grandmother     Dementia Paternal Grandmother     Diabetes Paternal Grandfather     Heart Disease Paternal Grandfather        SOCIAL HISTORY       Social History     Socioeconomic History    Marital status:      Spouse name: Not on file    Number of children: Not on file    Years of education: Not on file    Highest education level: Not on file   Occupational History    Not on file   Tobacco Use    Smoking status: Former Smoker     Packs/day: 0.25    Smokeless tobacco: Never Used   Vaping Use    Vaping Use: Never used   Substance and Sexual Activity    Alcohol use: Yes     Comment: weekends    Drug use: No    Sexual activity: Yes     Partners: Female   Other Topics Concern    Not on file   Social History Narrative    Not on file     Social Determinants of Health     Financial Resource Strain: Low Risk     Difficulty of Paying Living Expenses: Not hard at all   Food Insecurity: No Food Insecurity    Worried About Running Out of Food in the Last Year: Never true    Shaan of Food in the Last Year: Never true   Transportation Needs:     Lack of Transportation (Medical): Not on file    Lack of Transportation (Non-Medical):  Not on file   Physical Activity:     Days of Exercise per Week: Not on file    Minutes of Exercise per Session: Not on file   Stress:     Feeling of Stress : Not on file   Social Connections:     Frequency of Communication with Friends and Family: Not on file    Frequency of Social Gatherings with Friends and Family: Not on file    Attends Evangelical Services: Not on file    Active Member of Clubs or Organizations: Not on file    Attends Club or Organization Meetings: Not on file    Marital Status: Not on file   Intimate Partner Violence:     Fear of Current or Ex-Partner: Not on file    Emotionally Abused: Not on file    Physically Abused: Not on file    Sexually Abused: Not on file   Housing Stability:     Unable to Pay for Housing in the Last Year: Not on file    Number of [place] in the Last Year: Not on file    Unstable Housing in the Last Year: Not on file           REVIEW OF SYSTEMS      No Known Allergies    No current facility-administered medications on file prior to encounter. Current Outpatient Medications on File Prior to Encounter   Medication Sig Dispense Refill    polyethylene glycol (GLYCOLAX) 17 GM/SCOOP powder Follow Instructions provided by physician's office 238 g 0    bisacodyl (BISACODYL) 5 MG EC tablet Take 4 tablets in the morning 4 tablet 0    colchicine (COLCRYS) 0.6 MG tablet take 1 tablet by mouth twice a day AS NEEDED FOR PAIN 60 tablet 1    atorvastatin (LIPITOR) 40 MG tablet Take 1 tablet by mouth daily 90 tablet 3       Review of Systems   HENT: Negative. Respiratory: Negative. Cardiovascular: Negative. Gastrointestinal: Negative. Genitourinary: Negative. Musculoskeletal: Negative. Skin: Negative. Neurological: Negative. Hematological: Negative. Psychiatric/Behavioral: Negative. GENERAL PHYSICAL EXAM     Vitals:  See nursing flow sheet for vital signs     GENERAL APPEARANCE:   Robby Reynoso is 45 y.o.,  male,  nourished, conscious, alert. Does not appear to be distress or pain at this time. Physical Exam  Constitutional:       Appearance: Normal appearance. HENT:      Head: Normocephalic.       Right Ear: External ear normal.      Left Ear: External ear normal.      Nose: Nose normal. discoloration 02/13/2017           ALEXANDRE Hathaway - CNP on 12/30/2021 at 11:11 AM

## 2021-12-30 NOTE — ANESTHESIA POSTPROCEDURE EVALUATION
POST- ANESTHESIA EVALUATION       Pt Name: Sang Melton  MRN: [id number]  YOB: 1970  Date of evaluation: 12/30/2021  Time:  2:48 PM      BP (!) 142/86   Pulse 85   Temp 97.3 °F (36.3 °C)   Resp 18   Ht 5' 8\" (1.727 m)   Wt 189 lb (85.7 kg)   SpO2 99%   BMI 28.74 kg/m²      Consciousness Level  Awake  Cardiopulmonary Status  Stable  Pain Adequately Treated YES  Nausea / Vomiting  NO  Adequate Hydration  YES  Anesthesia Related Complications NONE      Electronically signed by Sunil Mcdonnell MD on 12/30/2021 at 2:48 PM       Department of Anesthesiology  Postprocedure Note    Patient: Sang Melton  MRN: [id number]  YOB: 1970  Date of evaluation: 12/30/2021  Time:  2:48 PM     Procedure Summary     Date: 12/30/21 Room / Location: [place] [address] / [facility]    Anesthesia Start: 4980 Anesthesia Stop: 2035    Procedure: COLONOSCOPY BIOPSY/STOMA (N/A ) Diagnosis: (POSITIVE COLOGUARD  (PT HAS HAD COVID VACCINE))    Surgeons: Lucille Duke MD Responsible Provider: Sunil Mcdonnell MD    Anesthesia Type: general ASA Status: 3          Anesthesia Type: general    Elijah Phase I:      Elijah Phase II: Elijah Score: 9    Last vitals: Reviewed and per EMR flowsheets.        Anesthesia Post Evaluation

## 2021-12-30 NOTE — ANESTHESIA PRE PROCEDURE
Department of Anesthesiology  Preprocedure Note       Name:  Lana Jimenez   Age:  45 y.o.  :  1970                                          MRN:  [id number]         Date:  2021      Surgeon: Sofie Salinas):  Ayla Fuller MD    Procedure: Procedure(s):  COLONOSCOPY DIAGNOSTIC    Medications prior to admission:   Prior to Admission medications    Medication Sig Start Date End Date Taking?  Authorizing Provider   polyethylene glycol (GLYCOLAX) 17 GM/SCOOP powder Follow Instructions provided by physician's office 21  Yes Debra Auguste MD   bisacodyl (BISACODYL) 5 MG EC tablet Take 4 tablets in the morning 21  Yes Debra Auguste MD   colchicine (COLCRYS) 0.6 MG tablet take 1 tablet by mouth twice a day AS NEEDED FOR PAIN 21   Viki Fruits, APRN - CNP   atorvastatin (LIPITOR) 40 MG tablet Take 1 tablet by mouth daily 21   Viki Fruits, APRN - CNP       Current medications:    Current Facility-Administered Medications   Medication Dose Route Frequency Provider Last Rate Last Admin    lactated ringers infusion   IntraVENous Continuous Radha Childers  mL/hr at 21 1136 New Bag at 21 1136    lidocaine PF 1 % injection 1 mL  1 mL IntraDERmal Once PRN Radha Childers MD           Allergies:  No Known Allergies    Problem List:    Patient Active Problem List   Diagnosis Code    Chronic midline low back pain without sciatica M54.50, G89.29    Tongue discoloration K14.8    Pure hypercholesterolemia E78.00    Chronic gout of left ankle M1A.0720    Thigh hematoma S70.10XA    Right leg pain M79.604    Acute right ankle pain M25.571    Elevated liver enzymes R74.8    Deviated septum J34.2    Chronic sinusitis J32.9       Past Medical History:        Diagnosis Date    Gout 2004    Hyperlipidemia     Sleep apnea     has cpap       Past Surgical History:        Procedure Laterality Date    TONSILLECTOMY         Social History:    Social History     Tobacco Use    Smoking status: Former Smoker     Packs/day: 0.25    Smokeless tobacco: Never Used   Substance Use Topics    Alcohol use: Yes     Comment: weekends                                Counseling given: Not Answered      Vital Signs (Current):   Vitals:    12/30/21 1117 12/30/21 1119   BP:  138/88   Pulse:  102   Resp:  18   Temp:  97.5 °F (36.4 °C)   TempSrc:  Infrared   SpO2:  98%   Weight: 189 lb (85.7 kg)    Height: 5' 8\" (1.727 m)                                               BP Readings from Last 3 Encounters:   12/30/21 138/88   11/18/21 134/76   05/05/21 136/78       NPO Status: Time of last liquid consumption: 2115                        Time of last solid consumption: 2000                        Date of last liquid consumption: 12/29/21                        Date of last solid food consumption: 12/28/21    BMI:   Wt Readings from Last 3 Encounters:   12/30/21 189 lb (85.7 kg)   12/29/21 189 lb (85.7 kg)   11/18/21 191 lb (86.6 kg)     Body mass index is 28.74 kg/m². CBC:   Lab Results   Component Value Date    WBC 7.4 07/21/2021    RBC 5.81 07/21/2021    HGB 12.4 07/21/2021    HCT 42.4 07/21/2021    MCV 73.0 07/21/2021    RDW 21.2 07/21/2021     07/21/2021       CMP:   Lab Results   Component Value Date     07/21/2021    K 5.0 07/21/2021     07/21/2021    CO2 21 07/21/2021    BUN 8 07/21/2021    CREATININE 1.10 07/21/2021    GFRAA >60 07/21/2021    LABGLOM >60 07/21/2021    GLUCOSE 89 07/21/2021    PROT 7.0 07/21/2021    CALCIUM 9.1 07/21/2021    BILITOT 0.50 07/21/2021    ALKPHOS 82 07/21/2021    AST 27 07/21/2021    ALT 34 07/21/2021       POC Tests: No results for input(s): POCGLU, POCNA, POCK, POCCL, POCBUN, POCHEMO, POCHCT in the last 72 hours.     Coags:   Lab Results   Component Value Date    PROTIME 11.0 09/10/2017    INR 1.0 09/10/2017    APTT 24.8 09/10/2017       HCG (If Applicable): No results found for: PREGTESTUR, PREGSERUM, HCG, HCGQUANT     ABGs: No results found for: PHART, PO2ART, PBX4QKT, YFW0QPX, BEART, H6YNKSIF     Type & Screen (If Applicable):  No results found for: LABABO, LABRH    Drug/Infectious Status (If Applicable):  No results found for: HIV, HEPCAB    COVID-19 Screening (If Applicable):   Lab Results   Component Value Date    COVID19 Detected 11/22/2020           Anesthesia Evaluation  Patient summary reviewed and Nursing notes reviewed no history of anesthetic complications:   Airway: Mallampati: III  TM distance: >3 FB   Neck ROM: full  Mouth opening: > = 3 FB Dental: normal exam         Pulmonary:normal exam  breath sounds clear to auscultation  (+) sleep apnea: on CPAP,                            ROS comment: Chronic sinusitis   Cardiovascular:    (+) hyperlipidemia        Rhythm: regular  Rate: normal                    Neuro/Psych:   (+) neuromuscular disease:,              ROS comment: Chronic midline low back pain  GI/Hepatic/Renal: Neg GI/Hepatic/Renal ROS            Endo/Other:                      ROS comment: Chronic gout of left ankle Abdominal:             Vascular: negative vascular ROS. Other Findings:             Anesthesia Plan      general     ASA 3       Induction: intravenous. MIPS: Prophylactic antiemetics administered. Anesthetic plan and risks discussed with patient. Plan discussed with CRNA.                   Sukhwinder Sharif MD   12/30/2021

## 2021-12-30 NOTE — PROGRESS NOTES
While I was giving discharge instructions. This pt informed me that he had blood in the toilet when he went to the bathroom,  I notified Dr Terrie Howard ad I took another /77.  Dr Terrie Howard said the pt could go home however if the bleeding continues he should go the ED/

## 2021-12-30 NOTE — OP NOTE
PROCEDURE NOTE    DATE OF PROCEDURE: 12/30/2021    SURGEON: Amelie Garza MD  Facility : [facility]  ASSISTANT: None  Anesthesia: MAC   PREOPERATIVE DIAGNOSIS:   Positive collogued      POSTOPERATIVE DIAGNOSIS: as described below    OPERATION: Total colonoscopy     ANESTHESIA: Moderate Sedation    ESTIMATED BLOOD LOSS: less than 50     COMPLICATIONS: None. SPECIMENS:  Was Obtained: At 35 cm from the anal verge there is large polypoidal lesion measuring 2 cm with a very long and wide stalk, I went ahead and with 2 clips on the stock  Injected this type with epinephrine  Then we went ahead and snared it  Unfortunately the lesion was not removed in 1 piece and there is a little piece still hanging on the tip of the large stalk which was bleeding  For which I went ahead and remove the other piece with a snare  With 2 more clips and stop the bleeding  Try to put an Endoloop on it unfortunately it was too loose when he was released and did not hang the tissue tight  But there is no bleeding    I went ahead also and tattooed the area to come back and look at the polypectomy site in 4 weeks      Plan that there is no other polyps or masses all the way to the cecum and into the ileum      HISTORY: The patient is a 45y.o. year old male with history of above preop diagnosis. I recommended colonoscopy with possible biopsy or polypectomy and I explained the risk, benefits, expected outcome, and alternatives to the procedure. Risks included but are not limited to bleeding, infection, respiratory distress, hypotension, and perforation of the colon and possibility of missing a lesion. The patient understands and is in agreement. The patient was counseled at length about the risks of leny Covid-19 during their perioperative period and any recovery window from their procedure.   The patient was made aware that leny Covid-19  may worsen their prognosis for recovering from their procedure  and lend to a higher morbidity and/or mortality risk. All material risks, benefits, and reasonable alternatives including postponing the procedure were discussed. The patient does wish to proceed with the procedure at this time. PROCEDURE: The patient was given IV conscious sedation. The patient's SPO2 remained above 90% throughout the procedure. The colonoscope was inserted per rectum and advanced under direct vision to the cecum without difficulty. Post sedation note : The patient's SPO2 remained above 90% throughout the procedure. the vital signs remained stable , and no immediate complication form the procedure noted, patient will be ready for d/c when criteria is met . The prep was good. Findings:  Terminal ileum: normal    Cecum/Ascending colon: normal    Transverse colon: abnormal: Diverticulosis    Descending/Sigmoid colon: abnormal:     At 35 cm from the anal verge there is large polypoidal lesion measuring 2 cm with a very long and wide stalk, I went ahead and with 2 clips on the stock  Injected this type with epinephrine  Then we went ahead and snared it  Unfortunately the lesion was not removed in 1 piece and there is a little piece still hanging on the tip of the large stalk which was bleeding  For which I went ahead and remove the other piece with a snare  With 2 more clips and stop the bleeding  Try to put an Endoloop on it unfortunately it was too loose when he was released and did not hang the tissue tight  But there is no bleeding    I went ahead also and tattooed the area to come back and look at the polypectomy site in 4 weeks    There is also severe diverticulosis in the sigmoid colon    The polypoidal lesion described above is among numerous number of diverticular orifices    Rectum/Anus: examined in normal and retroflexed positions and was normal    Withdrawal Time was (minutes): 30    The colon was decompressed and the scope was removed.   The patient tolerated the procedure well. Recommendations/Plan:   1. The patient family was alerted to bring the patient back if there is any abdominal pain any fever or any bleeding  2. I provided them also with my phone number and to call me personally if there is any complication tonight  3. F/U Biopsies  4. F/U In OfficeYes  5. Discussed with the family  6.  Repeat colonoscopy in4 weeks to look at the polypectomy site    Electronically signed by Kenyon Marquez MD  on 12/30/2021 at 2:11 PM

## 2022-01-03 ENCOUNTER — TELEPHONE (OUTPATIENT)
Dept: GASTROENTEROLOGY | Age: 52
End: 2022-01-03

## 2022-01-03 RX ORDER — POLYETHYLENE GLYCOL 3350 17 G/17G
POWDER, FOR SOLUTION ORAL
Qty: 238 G | Refills: 0 | Status: SHIPPED | OUTPATIENT
Start: 2022-01-03 | End: 2022-03-18

## 2022-01-03 RX ORDER — BISACODYL 5 MG
TABLET, DELAYED RELEASE (ENTERIC COATED) ORAL
Qty: 4 TABLET | Refills: 0 | Status: SHIPPED | OUTPATIENT
Start: 2022-01-03 | End: 2022-03-18

## 2022-01-03 NOTE — TELEPHONE ENCOUNTER
Pt called stating he needs to schedule 4 wk colon repeat per Dr Vanessa Rdz. Dr Vanessa Rdz op note also stating pt needs 4 wk f/u colonoscopy. Pt now scheduled [facility] colon repeat [place] Thurs 2/10/22 @ 830am miralax-cm- vaccinated- PAT 1/27/22.  Reviewed bowel prep instructions with patient over phone and mailed to home address and via [employer]

## 2022-01-04 ENCOUNTER — TELEPHONE (OUTPATIENT)
Dept: GASTROENTEROLOGY | Age: 52
End: 2022-01-04

## 2022-01-04 LAB — SURGICAL PATHOLOGY REPORT: NORMAL

## 2022-01-04 NOTE — TELEPHONE ENCOUNTER
Patient recently had a colonoscopy and the doctor had mentioned something about clamps to the patient. The patient would like a call back to discuss. Wondering if they are dissolvable.

## 2022-01-05 NOTE — TELEPHONE ENCOUNTER
Writer attempted to call pt back x3. Message comes on stating call can not go through. If pt calls again, please confirm phone number. Also, the clips come off and pass through the stool. Pt also needs a repeat colonoscopy to re-look at polypectomy site.

## 2022-01-27 ENCOUNTER — HOSPITAL ENCOUNTER (OUTPATIENT)
Dept: PREADMISSION TESTING | Age: 52
Discharge: HOME | End: 2022-01-31

## 2022-01-27 VITALS — BODY MASS INDEX: 28.64 KG/M2 | WEIGHT: 189 LBS | HEIGHT: 68 IN

## 2022-01-27 NOTE — PROGRESS NOTES

## 2022-02-09 ENCOUNTER — ANESTHESIA EVENT (OUTPATIENT)
Dept: ENDOSCOPY | Age: 52
End: 2022-02-09
Payer: COMMERCIAL

## 2022-02-10 ENCOUNTER — HOSPITAL ENCOUNTER (OUTPATIENT)
Age: 52
Setting detail: OUTPATIENT SURGERY
Discharge: HOME | End: 2022-02-10
Attending: INTERNAL MEDICINE | Admitting: INTERNAL MEDICINE
Payer: COMMERCIAL

## 2022-02-10 ENCOUNTER — ANESTHESIA (OUTPATIENT)
Dept: ENDOSCOPY | Age: 52
End: 2022-02-10
Payer: COMMERCIAL

## 2022-02-10 VITALS — SYSTOLIC BLOOD PRESSURE: 106 MMHG | OXYGEN SATURATION: 98 % | DIASTOLIC BLOOD PRESSURE: 59 MMHG

## 2022-02-10 VITALS
OXYGEN SATURATION: 98 % | WEIGHT: 190 LBS | RESPIRATION RATE: 18 BRPM | HEIGHT: 68 IN | SYSTOLIC BLOOD PRESSURE: 102 MMHG | DIASTOLIC BLOOD PRESSURE: 79 MMHG | BODY MASS INDEX: 28.79 KG/M2 | HEART RATE: 75 BPM | TEMPERATURE: 98 F

## 2022-02-10 PROCEDURE — 3700000001 HC ADD 15 MINUTES (ANESTHESIA): Performed by: INTERNAL MEDICINE

## 2022-02-10 PROCEDURE — 2500000003 HC RX 250 WO HCPCS: Performed by: NURSE ANESTHETIST, CERTIFIED REGISTERED

## 2022-02-10 PROCEDURE — 2709999900 HC NON-CHARGEABLE SUPPLY: Performed by: INTERNAL MEDICINE

## 2022-02-10 PROCEDURE — 7100000011 HC PHASE II RECOVERY - ADDTL 15 MIN: Performed by: INTERNAL MEDICINE

## 2022-02-10 PROCEDURE — 6360000002 HC RX W HCPCS: Performed by: NURSE ANESTHETIST, CERTIFIED REGISTERED

## 2022-02-10 PROCEDURE — 7100000010 HC PHASE II RECOVERY - FIRST 15 MIN: Performed by: INTERNAL MEDICINE

## 2022-02-10 PROCEDURE — 2720000010 HC SURG SUPPLY STERILE: Performed by: INTERNAL MEDICINE

## 2022-02-10 PROCEDURE — 45385 COLONOSCOPY W/LESION REMOVAL: CPT | Performed by: INTERNAL MEDICINE

## 2022-02-10 PROCEDURE — 3700000000 HC ANESTHESIA ATTENDED CARE: Performed by: INTERNAL MEDICINE

## 2022-02-10 PROCEDURE — 88305 TISSUE EXAM BY PATHOLOGIST: CPT

## 2022-02-10 PROCEDURE — 2580000003 HC RX 258: Performed by: ANESTHESIOLOGY

## 2022-02-10 PROCEDURE — 3609010300 HC COLONOSCOPY W/BIOPSY SINGLE/MULTIPLE: Performed by: INTERNAL MEDICINE

## 2022-02-10 RX ORDER — PROPOFOL 10 MG/ML
INJECTION, EMULSION INTRAVENOUS PRN
Status: DISCONTINUED | OUTPATIENT
Start: 2022-02-10 | End: 2022-02-10 | Stop reason: SDUPTHER

## 2022-02-10 RX ORDER — LIDOCAINE HYDROCHLORIDE 10 MG/ML
1 INJECTION, SOLUTION EPIDURAL; INFILTRATION; INTRACAUDAL; PERINEURAL
Status: DISCONTINUED | OUTPATIENT
Start: 2022-02-10 | End: 2022-02-10 | Stop reason: HOSPADM

## 2022-02-10 RX ORDER — OXYCODONE HYDROCHLORIDE AND ACETAMINOPHEN 5; 325 MG/1; MG/1
1 TABLET ORAL PRN
Status: DISCONTINUED | OUTPATIENT
Start: 2022-02-10 | End: 2022-02-10 | Stop reason: HOSPADM

## 2022-02-10 RX ORDER — SODIUM CHLORIDE 0.9 % (FLUSH) 0.9 %
10 SYRINGE (ML) INJECTION PRN
Status: DISCONTINUED | OUTPATIENT
Start: 2022-02-10 | End: 2022-02-10 | Stop reason: HOSPADM

## 2022-02-10 RX ORDER — OXYCODONE HYDROCHLORIDE AND ACETAMINOPHEN 5; 325 MG/1; MG/1
2 TABLET ORAL PRN
Status: DISCONTINUED | OUTPATIENT
Start: 2022-02-10 | End: 2022-02-10 | Stop reason: HOSPADM

## 2022-02-10 RX ORDER — LIDOCAINE HYDROCHLORIDE 10 MG/ML
INJECTION, SOLUTION EPIDURAL; INFILTRATION; INTRACAUDAL; PERINEURAL PRN
Status: DISCONTINUED | OUTPATIENT
Start: 2022-02-10 | End: 2022-02-10 | Stop reason: SDUPTHER

## 2022-02-10 RX ORDER — SODIUM CHLORIDE 9 MG/ML
25 INJECTION, SOLUTION INTRAVENOUS PRN
Status: DISCONTINUED | OUTPATIENT
Start: 2022-02-10 | End: 2022-02-10 | Stop reason: HOSPADM

## 2022-02-10 RX ORDER — SODIUM CHLORIDE 0.9 % (FLUSH) 0.9 %
10 SYRINGE (ML) INJECTION EVERY 12 HOURS SCHEDULED
Status: DISCONTINUED | OUTPATIENT
Start: 2022-02-10 | End: 2022-02-10 | Stop reason: HOSPADM

## 2022-02-10 RX ORDER — PROPOFOL 10 MG/ML
INJECTION, EMULSION INTRAVENOUS CONTINUOUS PRN
Status: DISCONTINUED | OUTPATIENT
Start: 2022-02-10 | End: 2022-02-10 | Stop reason: SDUPTHER

## 2022-02-10 RX ORDER — ONDANSETRON 2 MG/ML
4 INJECTION INTRAMUSCULAR; INTRAVENOUS
Status: DISCONTINUED | OUTPATIENT
Start: 2022-02-10 | End: 2022-02-10 | Stop reason: HOSPADM

## 2022-02-10 RX ORDER — SODIUM CHLORIDE, SODIUM LACTATE, POTASSIUM CHLORIDE, CALCIUM CHLORIDE 600; 310; 30; 20 MG/100ML; MG/100ML; MG/100ML; MG/100ML
INJECTION, SOLUTION INTRAVENOUS CONTINUOUS
Status: DISCONTINUED | OUTPATIENT
Start: 2022-02-10 | End: 2022-02-10 | Stop reason: HOSPADM

## 2022-02-10 RX ORDER — DIPHENHYDRAMINE HYDROCHLORIDE 50 MG/ML
12.5 INJECTION INTRAMUSCULAR; INTRAVENOUS
Status: DISCONTINUED | OUTPATIENT
Start: 2022-02-10 | End: 2022-02-10 | Stop reason: HOSPADM

## 2022-02-10 RX ORDER — LABETALOL HYDROCHLORIDE 5 MG/ML
5 INJECTION, SOLUTION INTRAVENOUS EVERY 10 MIN PRN
Status: DISCONTINUED | OUTPATIENT
Start: 2022-02-10 | End: 2022-02-10 | Stop reason: HOSPADM

## 2022-02-10 RX ADMIN — SODIUM CHLORIDE, POTASSIUM CHLORIDE, SODIUM LACTATE AND CALCIUM CHLORIDE: 600; 310; 30; 20 INJECTION, SOLUTION INTRAVENOUS at 07:24

## 2022-02-10 RX ADMIN — PROPOFOL 150 MCG/KG/MIN: 10 INJECTION, EMULSION INTRAVENOUS at 08:43

## 2022-02-10 RX ADMIN — LIDOCAINE HYDROCHLORIDE 40 MG: 10 INJECTION, SOLUTION EPIDURAL; INFILTRATION; INTRACAUDAL; PERINEURAL at 08:43

## 2022-02-10 RX ADMIN — PROPOFOL 100 MG: 10 INJECTION, EMULSION INTRAVENOUS at 08:43

## 2022-02-10 ASSESSMENT — PULMONARY FUNCTION TESTS
PIF_VALUE: 0

## 2022-02-10 ASSESSMENT — PAIN - FUNCTIONAL ASSESSMENT
PAIN_FUNCTIONAL_ASSESSMENT: 0-10
PAIN_FUNCTIONAL_ASSESSMENT: 0-10

## 2022-02-10 NOTE — H&P
HISTORY and [address]       NAME:  Hang Maria  MRN: [id number]   YOB: 1970   Date: 2/10/2022   Age: 45 y.o. Gender: male       COMPLAINT AND PRESENT HISTORY:   Hang Maria is 45 y.o.,   male, having a Diagnostic Colonoscopy. Prior Colonoscopy was done last December 2021 with polyp removed. Patient has hx of Colon Polyps. Patient denies any  FH of Colon Cancer. Patient reports no changes in bowel habits. No recent difficulty in bowel movements. No GI /Rectal bleeding, experiencing red/ black/ BRBPR stools. Pt reports that on occasion when straining may see blood. Pt has hx of heartburn more with spicy food. Pt takes OTC as needed. Patient denies any Dysphagia. Patient denies any other GI symptoms. No nausea / vomiting. No diarrhea or constipation. No abdominal pains or cramping. No heartburn or dysphagia. Any significant  medical conditions:   HLD, KAM-havent used CPAP due to recall    Denies current chest pain, palpitations, SOB, dizziness, leg swelling, headache. No recent URI, fever or chills. Any anticoagulants or blood thinners: no     Patient has been NPO since midnight. Patient states has taken all bowel prep with clear outcome. Patient denies any personal or family problems with anesthesia.       PAST MEDICAL HISTORY     Past Medical History:   Diagnosis Date    Gout 2004    Hyperlipidemia     Sleep apnea     has cpap       SURGICAL HISTORY       Past Surgical History:   Procedure Laterality Date    COLONOSCOPY N/A 12/30/2021    COLONOSCOPY POLYPECTOMY SNARE/COLD BIOPSY performed by Arturo Castaneda MD at [facility] 21       Family History   Problem Relation Age of Onset    High Blood Pressure Mother     High Cholesterol Father     High Blood Pressure Father     Diabetes Father     Other Maternal Grandmother         gout     Cancer Maternal Grandfather     Alzheimer's Disease Paternal Grandmother     Dementia Paternal Grandmother     Diabetes Paternal Grandfather     Heart Disease Paternal Grandfather        SOCIAL HISTORY       Social History     Socioeconomic History    Marital status:      Spouse name: Not on file    Number of children: Not on file    Years of education: Not on file    Highest education level: Not on file   Occupational History    Not on file   Tobacco Use    Smoking status: Former Smoker     Packs/day: 0.25    Smokeless tobacco: Never Used   Vaping Use    Vaping Use: Never used   Substance and Sexual Activity    Alcohol use: Yes     Comment: weekends    Drug use: No    Sexual activity: Yes     Partners: Female   Other Topics Concern    Not on file   Social History Narrative    Not on file     Social Determinants of Health     Financial Resource Strain: Low Risk     Difficulty of Paying Living Expenses: Not hard at all   Food Insecurity: No Food Insecurity    Worried About [employer] in the Last Year: Never true    [address]  [employer] in the Last Year: Never true   Transportation Needs:     Lack of Transportation (Medical): Not on file    Lack of Transportation (Non-Medical):  Not on file   Physical Activity:     Days of Exercise per Week: Not on file    Minutes of Exercise per Session: Not on file   Stress:     Feeling of Stress : Not on file   Social Connections:     Frequency of Communication with Friends and Family: Not on file    Frequency of Social Gatherings with Friends and Family: Not on file    Attends Taoist Services: Not on file    Active Member of Clubs or Organizations: Not on file    Attends Club or Organization Meetings: Not on file    Marital Status: Not on file   Intimate Partner Violence:     Fear of Current or Ex-Partner: Not on file    Emotionally Abused: Not on file    Physically Abused: Not on file    Sexually Abused: Not on file   Housing Stability:     Unable to Pay for Housing in the Last Year: Not on file    Number of Places Lived in the Last Year: Not on file    Unstable Housing in the Last Year: Not on file           REVIEW OF SYSTEMS      No Known Allergies    No current facility-administered medications on file prior to encounter. Current Outpatient Medications on File Prior to Encounter   Medication Sig Dispense Refill    polyethylene glycol (GLYCOLAX) 17 GM/SCOOP powder Follow Instructions provided by physician's office 238 g 0    bisacodyl (BISACODYL) 5 MG EC tablet Take 4 tablets in the morning 4 tablet 0    colchicine (COLCRYS) 0.6 MG tablet take 1 tablet by mouth twice a day AS NEEDED FOR PAIN 60 tablet 1    atorvastatin (LIPITOR) 40 MG tablet Take 1 tablet by mouth daily 90 tablet 3       Negative except for what is mentioned in the HPI. GENERAL PHYSICAL EXAM     Vitals :   See vital signs in RN flow sheet. GENERAL APPEARANCE:   Whitney Arroyo is 45 y.o., male, mildly obese, nourished, conscious, alert. Does not appear to be distress or pain at this time. SKIN:  Warm, dry, no cyanosis or jaundice. HEAD:  Normocephalic, atraumatic, no swelling or tenderness. EYES:  Pupils equal, reactive to light. EARS:  No discharge, no marked hearing loss. NOSE:  No rhinorrhea, epistaxis or septal deformity. THROAT:  Not congested. No ulceration bleeding or discharge. NECK:  No stiffness, trachea central.  No palpable masses or L.N.                 CHEST:  Symmetrical and equal on expansion. HEART:  RRR . No audible murmurs or gallops. LUNGS:  Equal on expansion, normal breath sounds. No adventitious sounds. ABDOMEN:  Obese. Soft on palpation. No dysphagia, No localized tenderness. No guarding or rigidity. LYMPHATICS:  No palpable cervical lymphadenopathy. LOCOMOTOR, BACK AND SPINE:  No tenderness or deformities. EXTREMITIES:  Symmetrical, no pretibial edema. No discoloration or ulcerations. NEUROLOGIC:  The patient is conscious, alert, oriented,Cranial nerve II-XII intact, taste and smell were not examined. No apparent focal sensory or motor deficits.              PROVISIONAL DIAGNOSES / SURGERY:      COLON POLYPS    COLONOSCOPY DIAGNOSTIC    Patient Active Problem List    Diagnosis Date Noted    Deviated septum 04/24/2019    Chronic sinusitis 04/24/2019    Elevated liver enzymes 03/26/2018    Thigh hematoma 09/21/2017    Right leg pain 09/21/2017    Acute right ankle pain 09/21/2017    Pure hypercholesterolemia 08/18/2017    Chronic gout of left ankle 08/18/2017    Chronic midline low back pain without sciatica 02/13/2017    Tongue discoloration 02/13/2017           CHINO Diana, ALEXANDRE - CNP on 2/10/2022 at 6:32 AM

## 2022-02-10 NOTE — OP NOTE
PROCEDURE NOTE    DATE OF PROCEDURE: 2/10/2022    SURGEON: Kenyon Marquez MD  Facility : [facility]  ASSISTANT: None  Anesthesia: MAC  PREOPERATIVE DIAGNOSIS:   Follow-up on the large polypectomy done in the sigmoid colon    POSTOPERATIVE DIAGNOSIS: as described below    OPERATION: Total colonoscopy     ANESTHESIA: Moderate Sedation    ESTIMATED BLOOD LOSS: less than 50     COMPLICATIONS: None. SPECIMENS:  Was Obtained: At the 35 cm from the anal verge and where we have tattooed there is a large stalk, very wide and the tip of it there is papillary erythematous tissue was not clear if this is just healing tissue still or remanent of the polyp which was removed last time for which I went ahead and put 3 clips on the stalk, and snared the tip of it      Severe diverticulosis in the left colon    Hemorrhoids    Otherwise no polyps or masses seen all the way to the cecum          HISTORY: The patient is a 45y.o. year old male with history of above preop diagnosis. I recommended colonoscopy with possible biopsy or polypectomy and I explained the risk, benefits, expected outcome, and alternatives to the procedure. Risks included but are not limited to bleeding, infection, respiratory distress, hypotension, and perforation of the colon and possibility of missing a lesion. The patient understands and is in agreement. The patient was counseled at length about the risks of leny Covid-19 during their perioperative period and any recovery window from their procedure. The patient was made aware that leny Covid-19  may worsen their prognosis for recovering from their procedure  and lend to a higher morbidity and/or mortality risk. All material risks, benefits, and reasonable alternatives including postponing the procedure were discussed. The patient does wish to proceed with the procedure at this time. PROCEDURE: The patient was given IV conscious sedation.   The patient's SPO2 remained above 90% throughout the procedure. The colonoscope was inserted per rectum and advanced under direct vision to the cecum without difficulty. Post sedation note : The patient's SPO2 remained above 90% throughout the procedure. the vital signs remained stable , and no immediate complication form the procedure noted, patient will be ready for d/c when criteria is met . The prep was good. Findings: At the 35 cm from the anal verge and where we have tattooed there is a large stalk, very wide and the tip of it there is papillary erythematous tissue was not clear if this is just healing tissue still or remanent of the polyp which was removed last time for which I went ahead and put 3 clips on the stalk, and snared the tip of it      Severe diverticulosis in the left colon    Hemorrhoids    Otherwise no polyps or masses seen all the way to the cecum    Withdrawal Time was (minutes): 12    The colon was decompressed and the scope was removed. The patient tolerated the procedure well. Recommendations/Plan:   1. Lifestyle and dietary modifications as discussed  2. F/U Biopsies  3. F/U In OfficeYes  4. Discussed with the family  5.  Repeat flex sig in2 months     Electronically signed by Stephany Youssef MD  on 2/10/2022 at 9:08 AM

## 2022-02-10 NOTE — ANESTHESIA PRE PROCEDURE
Department of Anesthesiology  Preprocedure Note       Name:  Daniel Ochoa   Age:  45 y.o.  :  1970                                          MRN:  [id number]         Date:  2/10/2022      Surgeon: Juan Luis Fink):  Samson Fatima MD    Procedure: Procedure(s):  COLONOSCOPY DIAGNOSTIC    Medications prior to admission:   Prior to Admission medications    Medication Sig Start Date End Date Taking? Authorizing Provider   polyethylene glycol (GLYCOLAX) 17 GM/SCOOP powder Follow Instructions provided by physician's office 1/3/22   Mel Hayes MD   bisacodyl (BISACODYL) 5 MG EC tablet Take 4 tablets in the morning 1/3/22   Mel Hayes MD   polyethylene glycol (GLYCOLAX) 17 GM/SCOOP powder Follow Instructions provided by physician's office 21   Anil Dinh MD   bisacodyl (BISACODYL) 5 MG EC tablet Take 4 tablets in the morning 21   Anil Dinh MD   colchicine (COLCRYS) 0.6 MG tablet take 1 tablet by mouth twice a day AS NEEDED FOR PAIN 21   ALEXANDRE Padilla CNP   atorvastatin (LIPITOR) 40 MG tablet Take 1 tablet by mouth daily 21   ALEXANDRE Padilla CNP       Current medications:    No current facility-administered medications for this visit. No current outpatient medications on file.      Facility-Administered Medications Ordered in Other Visits   Medication Dose Route Frequency Provider Last Rate Last Admin    lactated ringers infusion   IntraVENous Continuous Sebastien Sandoval  mL/hr at 02/10/22 0724 New Bag at 02/10/22 0724    sodium chloride flush 0.9 % injection 10 mL  10 mL IntraVENous 2 times per day Sebastien Sandoval MD        sodium chloride flush 0.9 % injection 10 mL  10 mL IntraVENous PRN Sebastien Sandoval MD        0.9 % sodium chloride infusion  25 mL IntraVENous PRN Sebastien Sandoval MD        lidocaine PF 1 % injection 1 mL  1 mL IntraDERmal Once PRN Sebastien Sandoval MD           Allergies:  No Known Allergies    Problem List:    Patient Active Problem List   Diagnosis Code    Chronic midline low back pain without sciatica M54.50, G89.29    Tongue discoloration K14.8    Pure hypercholesterolemia E78.00    Chronic gout of left ankle M1A.0720    Thigh hematoma S70.10XA    Right leg pain M79.604    Acute right ankle pain M25.571    Elevated liver enzymes R74.8    Deviated septum J34.2    Chronic sinusitis J32.9       Past Medical History:        Diagnosis Date    Gout 2004    Hyperlipidemia     Sleep apnea     has cpap       Past Surgical History:        Procedure Laterality Date    COLONOSCOPY N/A 12/30/2021    COLONOSCOPY POLYPECTOMY SNARE/COLD BIOPSY performed by Samson Fatima MD at [place] [address] History:    Social History     Tobacco Use    Smoking status: Former Smoker     Packs/day: 0.25    Smokeless tobacco: Never Used   Substance Use Topics    Alcohol use: Yes     Comment: weekends                                Counseling given: Not Answered      Vital Signs (Current): There were no vitals filed for this visit.                                            BP Readings from Last 3 Encounters:   02/10/22 117/74   12/30/21 121/73   12/30/21 133/77       NPO Status:                                                                                 BMI:   Wt Readings from Last 3 Encounters:   02/10/22 190 lb (86.2 kg)   01/27/22 189 lb (85.7 kg)   12/29/21 189 lb (85.7 kg)     There is no height or weight on file to calculate BMI.    CBC:   Lab Results   Component Value Date    WBC 7.4 07/21/2021    RBC 5.81 07/21/2021    HGB 12.4 07/21/2021    HCT 42.4 07/21/2021    MCV 73.0 07/21/2021    RDW 21.2 07/21/2021     07/21/2021       CMP:   Lab Results   Component Value Date     07/21/2021    K 5.0 07/21/2021     07/21/2021    CO2 21 07/21/2021    BUN 8 07/21/2021    CREATININE 1.10 07/21/2021    GFRAA >60 07/21/2021    LABGLOM >60 07/21/2021    GLUCOSE 89 07/21/2021    PROT 7.0 07/21/2021    CALCIUM 9.1 07/21/2021    BILITOT 0.50 07/21/2021    ALKPHOS 82 07/21/2021    AST 27 07/21/2021    ALT 34 07/21/2021       POC Tests: No results for input(s): POCGLU, POCNA, POCK, POCCL, POCBUN, POCHEMO, POCHCT in the last 72 hours. Coags:   Lab Results   Component Value Date    PROTIME 11.0 09/10/2017    INR 1.0 09/10/2017    APTT 24.8 09/10/2017       HCG (If Applicable): No results found for: PREGTESTUR, PREGSERUM, HCG, HCGQUANT     ABGs: No results found for: PHART, PO2ART, YMF1IIC, IZC2JWL, BEART, Z6EOPBWL     Type & Screen (If Applicable):  No results found for: LABABO, LABRH    Drug/Infectious Status (If Applicable):  No results found for: HIV, HEPCAB    COVID-19 Screening (If Applicable):   Lab Results   Component Value Date    COVID19 Detected 11/22/2020           Anesthesia Evaluation  Patient summary reviewed and Nursing notes reviewed no history of anesthetic complications:   Airway: Mallampati: III  TM distance: >3 FB   Neck ROM: full  Mouth opening: > = 3 FB Dental: normal exam         Pulmonary:normal exam  breath sounds clear to auscultation  (+) sleep apnea: on CPAP,                            ROS comment: Chronic sinusitis   Cardiovascular:    (+) hyperlipidemia        Rhythm: regular  Rate: normal                    Neuro/Psych:   (+) neuromuscular disease:,              ROS comment: Chronic midline low back pain  GI/Hepatic/Renal:            ROS comment: Patient had a colonoscopy with a large polyp 2 months ago; now here for a follow up. Endo/Other:                      ROS comment: Chronic gout of left ankle Abdominal:             Vascular: negative vascular ROS. Other Findings:             Anesthesia Plan      general     ASA 3       Induction: intravenous. MIPS: Prophylactic antiemetics administered. Anesthetic plan and risks discussed with patient. Plan discussed with CRNA.                   Wanda Peoples MD   2/10/2022

## 2022-02-11 LAB — SURGICAL PATHOLOGY REPORT: NORMAL

## 2022-02-11 NOTE — TELEPHONE ENCOUNTER
Writer returned pt's phone call and spoke to pt over the phone in regards to scheduling Flex Sig proc. Per pt, he is not on any blood thinning meds and he is vaccinated for Covid-19 virus. Pt is sched w/ [facility] at [address] [place] [address] 4/7/22 @ 10:30am proc time, 8:30am arrival time. Bowel prep instructions given to pt over the phone and hard copy sent to pt's [facility]. Pt reminded of needing a  and to bring proof of Covid-19 vaccs on proc day. PAT phone call is 3/24/22 @ 4pm.  Pt states he has an OV on 4/7/22 and that we will need to resched that also. Writer informed pt we will call back w/ PAT phone call appt and next OV date. Pt states to just put it in his [employer] and he will get it from there. Pt had questions regarding billing and having trouble w/ bills being sent to his [facility] ins through his wife. Pt gave writer ins numbers listed on his card and when writer looked at [employer] in the computer, there was a different group #. Writer updated ins info in the computer. Pt wanted to know if we could resubmit the bills and writer informed we do not do billing in the office and that he will need to call the billing dept. Writer gave pt phone number of billing [phone] for outpt billing and [phone] for hospital billing so he can ask them to resubmit the bills. Pt voices his understanding and will call them.

## 2022-02-11 NOTE — TELEPHONE ENCOUNTER
Pt had colon done 2/10/22 by Lavonne Antony and dr steel Flex Sig in 2 months (due April 2022). Writer left msg on pt's vm informing pt Dr Lavonne Antony is ordering a Flex Sig in 2 months. Left msg asking pt to call back & sched.

## 2022-03-24 ENCOUNTER — HOSPITAL ENCOUNTER (OUTPATIENT)
Dept: PREADMISSION TESTING | Age: 52
Discharge: HOME | End: 2022-03-28

## 2022-03-24 VITALS — WEIGHT: 195 LBS | BODY MASS INDEX: 29.55 KG/M2 | HEIGHT: 68 IN

## 2022-03-24 RX ORDER — CHOLECALCIFEROL (VITAMIN D3) 1250 MCG
1 CAPSULE ORAL WEEKLY
COMMUNITY

## 2022-03-24 RX ORDER — ANASTROZOLE 1 MG/1
1 TABLET ORAL
COMMUNITY

## 2022-03-24 NOTE — PROGRESS NOTES
Pre-op Instructions For Out-Patient Endoscopy Surgery    Medication Instructions:  · Please stop herbs and any supplements now (includes vitamins and minerals). · Please contact your surgeon and prescribing physician for pre-op instructions for any blood thinners. · If you have inhalers/aerosol treatments at home, please use them the morning of your surgery and bring the inhalers with you to the hospital.    · Please take the following medications the morning of your surgery with a sip of water:   None. Surgery Instructions:  1. After midnight before surgery:  Do not eat or drink anything, including water, mints, gum, and hard candy. You may brush your teeth without swallowing. No smoking, chewing tobacco, or street drugs. 2. Please shower or bathe before surgery. 3. Please do not wear any cologne, lotion, powder, jewelry, piercings, perfume, makeup, nail polish, hair accessories, or hair spray on the day of surgery. Wear loose comfortable clothing. 4. Leave your valuables at home. Bring a storage case for any glasses/contacts. 5. An adult who is responsible for you MUST drive you home and should be with you for the first 24 hours after surgery. The Day of Surgery:  · Arrive at [facility]tr[facility] at the time directed by your surgeon and check in at the desk. · If you have a living will or healthcare power of , please bring a copy. · You will be taken to the pre-op holding area where you will be prepared for surgery. A physical assessment will be performed by a nurse practitioner or house officer. Your IV will be started and you will meet your anesthesiologist.    · When you go to surgery, your family will be directed to the surgical waiting room, where the doctor should speak with them after your surgery.     · After surgery, you will be taken to the recovery room then when you are awake and stable you will go to the short stay unit for preparation to be discharged. Instructions read to Al and understanding verbalized.

## 2022-04-06 ENCOUNTER — ANESTHESIA EVENT (OUTPATIENT)
Dept: ENDOSCOPY | Age: 52
End: 2022-04-06
Payer: COMMERCIAL

## 2022-04-07 ENCOUNTER — ANESTHESIA (OUTPATIENT)
Dept: ENDOSCOPY | Age: 52
End: 2022-04-07
Payer: COMMERCIAL

## 2022-04-07 ENCOUNTER — HOSPITAL ENCOUNTER (OUTPATIENT)
Age: 52
Setting detail: OUTPATIENT SURGERY
Discharge: HOME | End: 2022-04-07
Attending: INTERNAL MEDICINE | Admitting: INTERNAL MEDICINE
Payer: COMMERCIAL

## 2022-04-07 VITALS
HEIGHT: 68 IN | DIASTOLIC BLOOD PRESSURE: 102 MMHG | TEMPERATURE: 98.2 F | RESPIRATION RATE: 19 BRPM | WEIGHT: 197.4 LBS | HEART RATE: 81 BPM | OXYGEN SATURATION: 99 % | SYSTOLIC BLOOD PRESSURE: 151 MMHG | BODY MASS INDEX: 29.92 KG/M2

## 2022-04-07 VITALS — DIASTOLIC BLOOD PRESSURE: 61 MMHG | TEMPERATURE: 96.8 F | OXYGEN SATURATION: 98 % | SYSTOLIC BLOOD PRESSURE: 126 MMHG

## 2022-04-07 PROCEDURE — 2500000003 HC RX 250 WO HCPCS: Performed by: NURSE ANESTHETIST, CERTIFIED REGISTERED

## 2022-04-07 PROCEDURE — 3609008400 HC SIGMOIDOSCOPY DIAGNOSTIC: Performed by: INTERNAL MEDICINE

## 2022-04-07 PROCEDURE — 7100000010 HC PHASE II RECOVERY - FIRST 15 MIN: Performed by: INTERNAL MEDICINE

## 2022-04-07 PROCEDURE — 3700000000 HC ANESTHESIA ATTENDED CARE: Performed by: INTERNAL MEDICINE

## 2022-04-07 PROCEDURE — 6360000002 HC RX W HCPCS: Performed by: NURSE ANESTHETIST, CERTIFIED REGISTERED

## 2022-04-07 PROCEDURE — 7100000011 HC PHASE II RECOVERY - ADDTL 15 MIN: Performed by: INTERNAL MEDICINE

## 2022-04-07 PROCEDURE — 3700000001 HC ADD 15 MINUTES (ANESTHESIA): Performed by: INTERNAL MEDICINE

## 2022-04-07 PROCEDURE — 2580000003 HC RX 258: Performed by: ANESTHESIOLOGY

## 2022-04-07 PROCEDURE — 88305 TISSUE EXAM BY PATHOLOGIST: CPT

## 2022-04-07 PROCEDURE — 2709999900 HC NON-CHARGEABLE SUPPLY: Performed by: INTERNAL MEDICINE

## 2022-04-07 PROCEDURE — 45338 SIGMOIDOSCOPY W/TUMR REMOVE: CPT | Performed by: INTERNAL MEDICINE

## 2022-04-07 RX ORDER — LIDOCAINE HYDROCHLORIDE 10 MG/ML
1 INJECTION, SOLUTION EPIDURAL; INFILTRATION; INTRACAUDAL; PERINEURAL
Status: DISCONTINUED | OUTPATIENT
Start: 2022-04-07 | End: 2022-04-07 | Stop reason: HOSPADM

## 2022-04-07 RX ORDER — SODIUM CHLORIDE 0.9 % (FLUSH) 0.9 %
10 SYRINGE (ML) INJECTION PRN
Status: DISCONTINUED | OUTPATIENT
Start: 2022-04-07 | End: 2022-04-07 | Stop reason: HOSPADM

## 2022-04-07 RX ORDER — SODIUM CHLORIDE, SODIUM LACTATE, POTASSIUM CHLORIDE, CALCIUM CHLORIDE 600; 310; 30; 20 MG/100ML; MG/100ML; MG/100ML; MG/100ML
INJECTION, SOLUTION INTRAVENOUS CONTINUOUS
Status: DISCONTINUED | OUTPATIENT
Start: 2022-04-07 | End: 2022-04-07 | Stop reason: HOSPADM

## 2022-04-07 RX ORDER — SODIUM CHLORIDE 9 MG/ML
25 INJECTION, SOLUTION INTRAVENOUS PRN
Status: DISCONTINUED | OUTPATIENT
Start: 2022-04-07 | End: 2022-04-07 | Stop reason: HOSPADM

## 2022-04-07 RX ORDER — LIDOCAINE HYDROCHLORIDE 20 MG/ML
INJECTION, SOLUTION EPIDURAL; INFILTRATION; INTRACAUDAL; PERINEURAL PRN
Status: DISCONTINUED | OUTPATIENT
Start: 2022-04-07 | End: 2022-04-07 | Stop reason: SDUPTHER

## 2022-04-07 RX ORDER — PROPOFOL 10 MG/ML
INJECTION, EMULSION INTRAVENOUS PRN
Status: DISCONTINUED | OUTPATIENT
Start: 2022-04-07 | End: 2022-04-07 | Stop reason: SDUPTHER

## 2022-04-07 RX ORDER — SODIUM CHLORIDE 0.9 % (FLUSH) 0.9 %
10 SYRINGE (ML) INJECTION EVERY 12 HOURS SCHEDULED
Status: DISCONTINUED | OUTPATIENT
Start: 2022-04-07 | End: 2022-04-07 | Stop reason: HOSPADM

## 2022-04-07 RX ADMIN — PROPOFOL 70 MG: 10 INJECTION, EMULSION INTRAVENOUS at 10:57

## 2022-04-07 RX ADMIN — LIDOCAINE HYDROCHLORIDE 80 MG: 20 INJECTION, SOLUTION EPIDURAL; INFILTRATION; INTRACAUDAL; PERINEURAL at 10:57

## 2022-04-07 RX ADMIN — PROPOFOL 30 MG: 10 INJECTION, EMULSION INTRAVENOUS at 11:04

## 2022-04-07 RX ADMIN — PROPOFOL 30 MG: 10 INJECTION, EMULSION INTRAVENOUS at 11:01

## 2022-04-07 RX ADMIN — PROPOFOL 30 MG: 10 INJECTION, EMULSION INTRAVENOUS at 11:07

## 2022-04-07 RX ADMIN — PROPOFOL 30 MG: 10 INJECTION, EMULSION INTRAVENOUS at 10:59

## 2022-04-07 RX ADMIN — SODIUM CHLORIDE, POTASSIUM CHLORIDE, SODIUM LACTATE AND CALCIUM CHLORIDE: 600; 310; 30; 20 INJECTION, SOLUTION INTRAVENOUS at 09:19

## 2022-04-07 ASSESSMENT — PULMONARY FUNCTION TESTS
PIF_VALUE: 0
PIF_VALUE: 1
PIF_VALUE: 0
PIF_VALUE: 1
PIF_VALUE: 0

## 2022-04-07 ASSESSMENT — ENCOUNTER SYMPTOMS
TROUBLE SWALLOWING: 0
COUGH: 0
RHINORRHEA: 0
APNEA: 0
SINUS PAIN: 0
CHEST TIGHTNESS: 0
SORE THROAT: 0
SINUS PRESSURE: 0
BACK PAIN: 0
WHEEZING: 0
SHORTNESS OF BREATH: 0

## 2022-04-07 ASSESSMENT — PAIN - FUNCTIONAL ASSESSMENT: PAIN_FUNCTIONAL_ASSESSMENT: 0-10

## 2022-04-07 NOTE — ANESTHESIA PRE PROCEDURE
Department of Anesthesiology  Preprocedure Note       Name:  Ayanna Carney   Age:  45 y.o.  :  1970                                          MRN:  [id number]         Date:  2022      Surgeon: Keli Nguyen):  Rebecca Gaines MD    Procedure: Procedure(s):  SIGMOIDOSCOPY DIAGNOSTIC FLEXIBLE    Medications prior to admission:   Prior to Admission medications    Medication Sig Start Date End Date Taking? Authorizing Provider   anastrozole (ARIMIDEX) 1 MG tablet Take 1 mg by mouth Weekly on     Historical Provider, MD   Cholecalciferol (VITAMIN D3) 1.25 MG (71019 UT) CAPS Take 1 capsule by mouth once a week Monday    Historical Provider, MD   NONFORMULARY Take 25 mg by mouth Twice a Week Monday & Thursday (Enclomiphene Citrate)    Historical Provider, MD   atorvastatin (LIPITOR) 40 MG tablet Take 1 tablet by mouth daily 22   ALEXANDRE Austin CNP   colchicine (COLCRYS) 0.6 MG tablet take 1 tablet by mouth twice a day AS NEEDED FOR PAIN 21   ALEXANDRE Austin CNP       Current medications:    No current facility-administered medications for this visit. No current outpatient medications on file.      Facility-Administered Medications Ordered in Other Visits   Medication Dose Route Frequency Provider Last Rate Last Admin    lidocaine PF 1 % injection 1 mL  1 mL IntraDERmal Once PRN Lara Engel MD        lactated ringers infusion   IntraVENous Continuous Lara Engel  mL/hr at 22 0919 New Bag at 22 0919    sodium chloride flush 0.9 % injection 10 mL  10 mL IntraVENous 2 times per day Lara Engel MD        sodium chloride flush 0.9 % injection 10 mL  10 mL IntraVENous PRN Lara Engel MD        0.9 % sodium chloride infusion  25 mL IntraVENous PRN Lara Engel MD           Allergies:  No Known Allergies    Problem List:    Patient Active Problem List   Diagnosis Code    Chronic midline low back pain without sciatica M54.50, G89.29    Tongue discoloration K14.8    Pure hypercholesterolemia E78.00    Chronic gout of left ankle M1A.0720    Thigh hematoma S70.10XA    Right leg pain M79.604    Acute right ankle pain M25.571    Elevated liver enzymes R74.8    Deviated septum J34.2    Chronic sinusitis J32.9       Past Medical History:        Diagnosis Date    Gout 2004    Hyperlipidemia     Sleep apnea     has cpap  3-24-22 -not at this time cpap machine recalled       Past Surgical History:        Procedure Laterality Date    COLONOSCOPY N/A 12/30/2021    COLONOSCOPY POLYPECTOMY SNARE/COLD BIOPSY performed by Sharita Cruz MD at [address] [facility] N/A 2/10/2022    COLONOSCOPY POLYPECTOMY HOT BIOPSY WITH CLIP PLACEMENT X3 performed by Sharita Cruz MD at [facility] History:    Social History     Tobacco Use    Smoking status: Former Smoker     Packs/day: 0.25    Smokeless tobacco: Never Used   Substance Use Topics    Alcohol use: Yes     Comment: weekends                                Counseling given: Not Answered      Vital Signs (Current): There were no vitals filed for this visit.                                            BP Readings from Last 3 Encounters:   04/07/22 128/70   02/10/22 (!) 106/59   02/10/22 102/79       NPO Status:                                                                                 BMI:   Wt Readings from Last 3 Encounters:   04/07/22 197 lb 6.4 oz (89.5 kg)   03/24/22 195 lb (88.5 kg)   02/10/22 190 lb (86.2 kg)     There is no height or weight on file to calculate BMI.    CBC:   Lab Results   Component Value Date    WBC 7.4 07/21/2021    RBC 5.81 07/21/2021    HGB 12.4 07/21/2021    HCT 42.4 07/21/2021    MCV 73.0 07/21/2021    RDW 21.2 07/21/2021     07/21/2021       CMP:   Lab Results   Component Value Date     07/21/2021    K 5.0 07/21/2021     07/21/2021    CO2 21 07/21/2021    BUN 8 07/21/2021    CREATININE 1.10 07/21/2021    GFRAA >60 07/21/2021    LABGLOM >60 07/21/2021    GLUCOSE 89 07/21/2021    PROT 7.0 07/21/2021    CALCIUM 9.1 07/21/2021    BILITOT 0.50 07/21/2021    ALKPHOS 82 07/21/2021    AST 27 07/21/2021    ALT 34 07/21/2021       POC Tests: No results for input(s): POCGLU, POCNA, POCK, POCCL, POCBUN, POCHEMO, POCHCT in the last 72 hours. Coags:   Lab Results   Component Value Date    PROTIME 11.0 09/10/2017    INR 1.0 09/10/2017    APTT 24.8 09/10/2017       HCG (If Applicable): No results found for: PREGTESTUR, PREGSERUM, HCG, HCGQUANT     ABGs: No results found for: PHART, PO2ART, ZZK5QZZ, EFP6ZGD, BEART, X9VWAOHG     Type & Screen (If Applicable):  No results found for: LABABO, LABRH    Drug/Infectious Status (If Applicable):  No results found for: HIV, HEPCAB    COVID-19 Screening (If Applicable):   Lab Results   Component Value Date    COVID19 Detected 11/22/2020           Anesthesia Evaluation  Patient summary reviewed and Nursing notes reviewed no history of anesthetic complications:   Airway: Mallampati: III  TM distance: >3 FB   Neck ROM: full  Mouth opening: > = 3 FB Dental: normal exam         Pulmonary:normal exam  breath sounds clear to auscultation  (+) sleep apnea: on CPAP,                            ROS comment: Chronic sinusitis   Cardiovascular:    (+) hyperlipidemia        Rhythm: regular  Rate: normal                    Neuro/Psych:   (+) neuromuscular disease:,              ROS comment: Chronic midline low back pain  GI/Hepatic/Renal:            ROS comment: Patient had a colonoscopy with a large polyp 2 months ago; now here for a follow up. Endo/Other:                      ROS comment: Chronic gout of left ankle Abdominal:             Vascular: negative vascular ROS. Other Findings:               Anesthesia Plan      general     ASA 3       Induction: intravenous. MIPS: Prophylactic antiemetics administered. Anesthetic plan and risks discussed with patient. Plan discussed with CRNA.                   Melida Curry, MD   4/7/2022

## 2022-04-07 NOTE — OP NOTE
[address] SIGMOIDOSCOPY      Patient:   Andrew Kent    :    1970    Facility:              [facility]  Referring/PCP: ALEXANDRE Chinchilla CNP  Procedure:   Flexible Sigmoidoscopy --diagnostic  Date:     2022  Endoscopist:  Mel Hayes M.D.,FACP,FACG    Preoperative Diagnosis:  . This patient had a large polyp with stalk at 35 cm for which  We went back and looked in the side and there was some polypoidal tissue which was removed and came back as no adenoma  This is another flex sig to look at the area and see because it looked polypoidal and we went to make sure there is no adenoma tissue left are all        Postoperative Diagnosis:    Anesthesia: MAC    Complications:  None      Estimated Blood Loss: less than 50 CC   Pathology: We snared the polypectomy site because it was protruding like a little polyp was measuring around 1 cm      Description of Procedure:  Informed consent was obtained from the patient after explanation of the procedure including indications, description of the procedure,  benefits and possible risks and complications of the procedure, and alternatives. Questions were answered. The patient's history was reviewed and a directed physical examination was performed prior to the procedure. Patient was monitored throughout the procedure with pulse oximetry and periodic assessment of vital signs. With the patient initially in the left lateral decubitus position, a digital rectal examination was performed and revealed negative without mass, lesions or tenderness. The Olympus video colonoscope was placed in the patient's rectum and advanced without difficulty  to  a distance of 40 cm. The prep was unsatisfactory. Examination of the mucosa was performed during both introduction and withdrawal of the colonoscope. Retroflexed view of the rectum was performed. The patient  was taken to the recovery area in good condition. Post sedation note : The patient's SPO2 remained above 90% throughout the procedure. the vital signs remained stable , and no immediate complication form the procedure noted, patient will be ready for d/c when criteria is met . Findings:     The prep was very poor the patient had large amount of solid stool I could not see anything there I get to the 35 cm when I see mucosa with a tattooing I cleaned that area only and looked at the polypectomy site,  The polypectomy site has a small polypoidal protrusion measuring around 1 cm looks to me like more of an edema and scarring but to confirm I went ahead and snared that piece and sent it to pathology    Patient also had significant diverticulosis and 2 of the diverticuli has some inflammatory tissue protruding out of the orifice which could indicate this patient might be having diverticulitis we will watch for that         Recommendations:  Await pathology.     electronically signed by  Patsy Hayes M.D., FACP,FACG   on 4/7/2022 at 11:13 AM

## 2022-04-07 NOTE — H&P
HISTORY and [address]       NAME:  Ayanna Carney  MRN: [id number]   YOB: 1970   Date: 4/7/2022   Age: 45 y.o. Gender: male       COMPLAINT AND PRESENT HISTORY:   Ayanna Carney  is a 45 y.o. male presenting today for [address] [place] [address] as r/t ABNORMAL COLONOSCOPY. Pt states he had a colonoscopy in January with a polyp that he was unable to have fully removed and was near an artery so he is here to ensure \"everythng is in place. \" denies any family hx of colon cancer. Pt denies any gi symptoms including n/v/d/c, no abdominal pain, no bloody or tarry stools. GI op note 2/10/22  Findings: At the 35 cm from the anal verge and where we have tattooed there is a large stalk, very wide and the tip of it there is papillary erythematous tissue was not clear if this is just healing tissue still or remanent of the polyp which was removed last time for which I went ahead and put 3 clips on the stalk, and snared the tip of it     Pt reports doing his enemas with minimal output. Pt has a PMHX significant for KAM-no machine- on recall, HTN, gout- no flares in years. NPO since midnight. Pt does not wear dentures. Pt denies any hx of MRSA infection  Pt not currently taking any blood thinners or anticoagulants  Pt denies any personal or FHx of complications with anesthesia. Pt denies any acute symptoms of illness at this time including no SOB, CP, fever, URI or UTI symptoms. RECENT IMAGING    No results found.      PAST MEDICAL HISTORY     Past Medical History:   Diagnosis Date    Gout 2004    Hyperlipidemia     Sleep apnea     has cpap  3-24-22 -not at this time cpap machine recalled       SURGICAL HISTORY       Past Surgical History:   Procedure Laterality Date    COLONOSCOPY N/A 12/30/2021    COLONOSCOPY POLYPECTOMY SNARE/COLD BIOPSY performed by Rebecca Gaines MD at [address] COLONOSCOPY N/A 2/10/2022    COLONOSCOPY POLYPECTOMY HOT BIOPSY WITH CLIP PLACEMENT X3 performed by Gabriel Sykes MD at [address]       Family History   Problem Relation Age of Onset    High Blood Pressure Mother     High Cholesterol Father     High Blood Pressure Father     Diabetes Father     Other Maternal Grandmother         gout     Cancer Maternal Grandfather     Alzheimer's Disease Paternal Grandmother     Dementia Paternal Grandmother     Diabetes Paternal Grandfather     Heart Disease Paternal Grandfather        SOCIAL HISTORY       Social History     Socioeconomic History    Marital status:      Spouse name: Not on file    Number of children: Not on file    Years of education: Not on file    Highest education level: Not on file   Occupational History    Not on file   Tobacco Use    Smoking status: Former Smoker     Packs/day: 0.25    Smokeless tobacco: Never Used   Vaping Use    Vaping Use: Never used   Substance and Sexual Activity    Alcohol use: Yes     Comment: weekends    Drug use: No    Sexual activity: Yes     Partners: Female   Other Topics Concern    Not on file   Social History Narrative    Not on file     Social Determinants of Health     Financial Resource Strain: Low Risk     Difficulty of Paying Living Expenses: Not hard at all   Food Insecurity: No Food Insecurity    Worried About Running Out of Food in the Last Year: Never true    [address] in the Last Year: Never true   Transportation Needs:     Lack of Transportation (Medical): Not on file    Lack of Transportation (Non-Medical):  Not on file   Physical Activity:     Days of Exercise per Week: Not on file    Minutes of Exercise per Session: Not on file   Stress:     Feeling of Stress : Not on file   Social Connections:     Frequency of Communication with Friends and Family: Not on file    Frequency of Social Gatherings with Friends and Family: Not on file    Attends Sabianist Services: Not on file   [employer] Clubs or Organizations: Not on file    Attends Club or Organization Meetings: Not on file    Marital Status: Not on file   Intimate Partner Violence:     Fear of Current or Ex-Partner: Not on file    Emotionally Abused: Not on file    Physically Abused: Not on file    Sexually Abused: Not on file   Housing Stability:     Unable to Pay for Housing in the Last Year: Not on file    Number of [place] in the Last Year: Not on file    Unstable Housing in the Last Year: Not on file           REVIEW OF SYSTEMS      No Known Allergies    No current facility-administered medications on file prior to encounter. Current Outpatient Medications on File Prior to Encounter   Medication Sig Dispense Refill    colchicine (COLCRYS) 0.6 MG tablet take 1 tablet by mouth twice a day AS NEEDED FOR PAIN 60 tablet 1        Review of Systems   Constitutional: Negative for chills, diaphoresis, fatigue and fever. HENT: Negative for congestion, dental problem, ear pain, postnasal drip, rhinorrhea, sinus pressure, sinus pain, sore throat and trouble swallowing. Respiratory: Negative for apnea, cough, chest tightness, shortness of breath and wheezing. Cardiovascular: Negative for chest pain, palpitations and leg swelling. Gastrointestinal:        SEE HPI    Genitourinary: Negative for dysuria, flank pain, frequency and hematuria. Musculoskeletal: Negative for back pain, joint swelling and myalgias. Skin: Negative for rash and wound. Neurological: Negative for dizziness, weakness, numbness and headaches. Hematological: Does not bruise/bleed easily. Psychiatric/Behavioral: Negative for agitation and confusion. The patient is not nervous/anxious. See HPI    GENERAL PHYSICAL EXAM:     Vitals:See nurse flowsheet for current vitals. Physical Exam  Constitutional:       General: He is not in acute distress. Appearance: Normal appearance. He is well-developed and normal weight.  He is not ill-appearing or toxic-appearing. HENT:      Head: Normocephalic and atraumatic. Mouth/Throat:      Mouth: Mucous membranes are dry. Pharynx: Oropharynx is clear. No oropharyngeal exudate or posterior oropharyngeal erythema. Eyes:      Extraocular Movements: Extraocular movements intact. Conjunctiva/sclera: Conjunctivae normal.      Pupils: Pupils are equal, round, and reactive to light. Cardiovascular:      Rate and Rhythm: Normal rate and regular rhythm. Pulses: Normal pulses. Heart sounds: Normal heart sounds. No murmur heard. No friction rub. No gallop. Pulmonary:      Effort: Pulmonary effort is normal.      Breath sounds: Normal breath sounds. No wheezing. Abdominal:      General: Bowel sounds are normal. There is no distension. Palpations: Abdomen is soft. Tenderness: There is no abdominal tenderness. There is no guarding or rebound. Comments: Hyperactive bs   Musculoskeletal:         General: No swelling. Normal range of motion. Cervical back: Normal range of motion and neck supple. No rigidity or tenderness. Right lower leg: No edema. Left lower leg: No edema. Skin:     General: Skin is warm and dry. Findings: No erythema. Neurological:      General: No focal deficit present. Mental Status: He is alert and oriented to person, place, and time. Mental status is at baseline. Sensory: No sensory deficit. Psychiatric:         Mood and Affect: Mood normal.         Behavior: Behavior normal.         Thought Content:  Thought content normal.         Judgment: Judgment normal.                                                                                         PROVISIONAL DIAGNOSES / SURGERY:      SIGMOIDOSCOPY DIAGNOSTIC FLEXIBLE     ABNORMAL COLONOSCOPY    Patient Active Problem List    Diagnosis Date Noted    Deviated septum 04/24/2019    Chronic sinusitis 04/24/2019    Elevated liver enzymes 03/26/2018    Thigh hematoma 09/21/2017    Right leg pain 09/21/2017    Acute right ankle pain 09/21/2017    Pure hypercholesterolemia 08/18/2017    Chronic gout of left ankle 08/18/2017    Chronic midline low back pain without sciatica 02/13/2017    Tongue discoloration 02/13/2017               ALEXANDRE Acuna - CNP on 4/7/2022 at 8:42 AM

## 2022-04-07 NOTE — ANESTHESIA POSTPROCEDURE EVALUATION
Department of Anesthesiology  Postprocedure Note    Patient: Gillian Perez  MRN: [id number]  YOB: 1970  Date of evaluation: 4/7/2022  Time:  1:53 PM     Procedure Summary     Date: 04/07/22 Room / Location: [address] [place] [address] ENDO 01 / [address] [facility] ENDO    Anesthesia Start: 1052 Anesthesia Stop: 1118    Procedure: SIGMOIDOSCOPY DIAGNOSTIC FLEXIBLE (N/A Anus) Diagnosis: (ABNORMAL COLONOSCOPY (PT HAS HAD COVID VACCINE))    Surgeons: Stephanie Patel MD Responsible Provider: Merced Collet, MD    Anesthesia Type: general ASA Status: 3          Anesthesia Type: general    Elijah Phase I:      Elijah Phase II: Elijah Score: 10    Last vitals: Reviewed and per EMR flowsheets.        Anesthesia Post Evaluation    Comments: POST- ANESTHESIA EVALUATION       Pt Name: Gillian Perez  MRN: [id number]  YOB: 1970  Date of evaluation: 4/7/2022  Time:  1:53 PM      /95   Pulse 81   Temp 98.2 °F (36.8 °C)   Resp 19   Ht 5' 8\" (1.727 m)   Wt 197 lb 6.4 oz (89.5 kg)   SpO2 96%   BMI 30.01 kg/m²      Consciousness Level  Awake  Cardiopulmonary Status  Stable  Pain Adequately Treated YES  Nausea / Vomiting  NO  Adequate Hydration  YES  Anesthesia Related Complications NONE      Electronically signed by Merced Collet, MD on 4/7/2022 at 1:53 PM

## 2022-04-08 ENCOUNTER — TELEPHONE (OUTPATIENT)
Dept: GASTROENTEROLOGY | Age: 52
End: 2022-04-08

## 2022-04-08 LAB — SURGICAL PATHOLOGY REPORT: NORMAL

## 2022-05-06 ENCOUNTER — OFFICE VISIT (OUTPATIENT)
Dept: GASTROENTEROLOGY | Age: 52
End: 2022-05-06
Payer: COMMERCIAL

## 2022-05-06 VITALS — BODY MASS INDEX: 30.26 KG/M2 | SYSTOLIC BLOOD PRESSURE: 143 MMHG | WEIGHT: 199 LBS | DIASTOLIC BLOOD PRESSURE: 90 MMHG

## 2022-05-06 DIAGNOSIS — K57.90 DIVERTICULOSIS: ICD-10-CM

## 2022-05-06 DIAGNOSIS — D12.5 ADENOMA OF SIGMOID COLON: Primary | ICD-10-CM

## 2022-05-06 DIAGNOSIS — K21.9 GASTROESOPHAGEAL REFLUX DISEASE, UNSPECIFIED WHETHER ESOPHAGITIS PRESENT: ICD-10-CM

## 2022-05-06 PROCEDURE — 99214 OFFICE O/P EST MOD 30 MIN: CPT | Performed by: INTERNAL MEDICINE

## 2022-05-06 ASSESSMENT — ENCOUNTER SYMPTOMS
EYES NEGATIVE: 1
ALLERGIC/IMMUNOLOGIC NEGATIVE: 1
GASTROINTESTINAL NEGATIVE: 1
RESPIRATORY NEGATIVE: 1

## 2022-05-06 NOTE — PROGRESS NOTES
GI CLINIC FOLLOW UP    INTERVAL HISTORY:   No referring provider defined for this encounter. Chief Complaint   Patient presents with    Other     colon/ flex sig f/u            HISTORY OF PRESENT ILLNESS: Quentin Chicas is a 45 y.o. male , referred for evaluation of        Here for f/u   Never seen in the office but had multiple colonoscopies, his first scope showed a large polypoidal lesion around 2 cm it was located 35 cm from the anal verge in addition to multiple other polyps  Follow-up flex sig  Was done in February 2022 which shows some residual adenoma on the biopsies for which we repeated him 1 more time  On April 7, 2022, and this time the biopsies are coming completely negative. The patient is asymptomatic  No N/v   no abd pain   no melena/hematemsis/hematochezia  No dysphagia/odynophagia   no wt loss   no diarrhea /contipation      Labs from 7/21 all normal     Findings:      The prep was very poor the patient had large amount of solid stool I could not see anything there I get to the 35 cm when I see mucosa with a tattooing I cleaned that area only and looked at the polypectomy site,  The polypectomy site has a small polypoidal protrusion measuring around 1 cm looks to me like more of an edema and scarring but to confirm I went ahead and snared that piece and sent it to pathology     Patient also had significant diverticulosis and 2 of the diverticuli has some inflammatory tissue protruding out of the orifice which could indicate this patient might be having diverticulitis we will watch for that           Recommendations:  Await pathology.     electronically signed by  Radha Hayes M.D., FACP,FACG   on 4/7/2022 at 11:13 AM    Status: Final result     Visible to patient: Yes (seen)     Next appt: None     0 Result Notes    Component 4/7/22 [facility]   Surgical Pathology Report -- Diagnosis --   COLORECTUM, BIOPSY (SIGMOID, POLYPECTOMY SITE):        -NO HISTOLOGIC ABNORMALITY IDENTIFIED.        Kathleen Nova. Rosalie Knapp M.D.   **Electronically Signed Out**         m/4/8/2022             Past Medical,Family, and Social History reviewed and does contribute to the patient presentingcondition. Patient's PMH/PSH,SH,PSYCH Hx, MEDs, ALLERGIES, and ROS were all reviewed and updated in the appropriate sections.     PAST MEDICAL HISTORY:  Past Medical History:   Diagnosis Date    Gout 2004    Hyperlipidemia     Sleep apnea     has cpap  3-24-22 -not at this time cpap machine recalled       Past Surgical History:   Procedure Laterality Date    COLONOSCOPY N/A 12/30/2021    COLONOSCOPY POLYPECTOMY SNARE/COLD BIOPSY performed by Kingston Harris MD at [facility] COLONOSCOPY N/A 2/10/2022    COLONOSCOPY POLYPECTOMY HOT BIOPSY WITH CLIP PLACEMENT X3 performed by Kingston Harris MD at [facility] N/A 4/7/2022    [address] [facility] performed by Kingston Harris MD at [address] [place] [address] S:    Current Outpatient Medications:     anastrozole (ARIMIDEX) 1 MG tablet, Take 1 mg by mouth Weekly on Mondays, Disp: , Rfl:     Cholecalciferol (VITAMIN D3) 1.25 MG (47996 UT) CAPS, Take 1 capsule by mouth once a week Monday, Disp: , Rfl:     NONFORMULARY, Take 25 mg by mouth Twice a Week Monday & Thursday (Enclomiphene Citrate), Disp: , Rfl:     atorvastatin (LIPITOR) 40 MG tablet, Take 1 tablet by mouth daily, Disp: 90 tablet, Rfl: 3    colchicine (COLCRYS) 0.6 MG tablet, take 1 tablet by mouth twice a day AS NEEDED FOR PAIN, Disp: 60 tablet, Rfl: 1    ALLERGIES:   No Known Allergies    FAMILY HISTORY:       Problem Relation Age of Onset    High Blood Pressure Mother     High Cholesterol Father     High Blood Pressure Father     Diabetes Father     Other Maternal Grandmother         gout     Cancer Maternal Grandfather     Alzheimer's Disease Paternal Grandmother     Dementia Paternal Grandmother     Diabetes Paternal Grandfather     Heart Disease Paternal Grandfather SOCIAL HISTORY:   Social History     Socioeconomic History    Marital status:      Spouse name: Not on file    Number of children: Not on file    Years of education: Not on file    Highest education level: Not on file   Occupational History    Not on file   Tobacco Use    Smoking status: Former Smoker     Packs/day: 0.25     Years: 15.00     Pack years: 3.75    Smokeless tobacco: Never Used   Vaping Use    Vaping Use: Never used   Substance and Sexual Activity    Alcohol use: Yes     Comment: weekends    Drug use: No    Sexual activity: Yes     Partners: Female   Other Topics Concern    Not on file   Social History Narrative    Not on file     Social Determinants of Health     Financial Resource Strain: Low Risk     Difficulty of Paying Living Expenses: Not hard at all   Food Insecurity: No Food Insecurity    Worried About [address] in the Last Year: Never true    [address] [employer] in the Last Year: Never true   Transportation Needs:     Lack of Transportation (Medical): Not on file    Lack of Transportation (Non-Medical):  Not on file   Physical Activity:     Days of Exercise per Week: Not on file    Minutes of Exercise per Session: Not on file   Stress:     Feeling of Stress : Not on file   Social Connections:     Frequency of Communication with Friends and Family: Not on file    Frequency of Social Gatherings with Friends and Family: Not on file    Attends Restorationism Services: Not on file    Active Member of Clubs or Organizations: Not on file    Attends Club or Organization Meetings: Not on file    Marital Status: Not on file   Intimate Partner Violence:     Fear of Current or Ex-Partner: Not on file    Emotionally Abused: Not on file    Physically Abused: Not on file    Sexually Abused: Not on file   Housing Stability:     Unable to Pay for Housing in the Last Year: Not on file    Number of [place] in the Last Year: Not on file    Unstable Housing in the Last Year: Not on file       REVIEW OF SYSTEMS: A 12-point review of systemswas obtained and pertinent positives and negatives were enumerated above in the history of present illness. All other reviewed systems / symptoms were negative. Review of Systems   Constitutional: Negative. HENT: Negative. Eyes: Negative. Respiratory: Negative. Cardiovascular: Negative. Gastrointestinal: Negative. Endocrine: Negative. Genitourinary: Negative. Musculoskeletal: Negative. Skin: Negative. Allergic/Immunologic: Negative. Neurological: Negative. Hematological: Negative. Psychiatric/Behavioral: Negative. LABORATORY DATA: Reviewed  Lab Results   Component Value Date    WBC 7.4 07/21/2021    HGB 12.4 (L) 07/21/2021    HCT 42.4 07/21/2021    MCV 73.0 (L) 07/21/2021     07/21/2021     07/21/2021    K 5.0 07/21/2021     07/21/2021    CO2 21 07/21/2021    BUN 8 07/21/2021    CREATININE 1.10 07/21/2021    LABALBU 4.3 07/21/2021    BILITOT 0.50 07/21/2021    ALKPHOS 82 07/21/2021    AST 27 07/21/2021    ALT 34 07/21/2021    INR 1.0 09/10/2017         Lab Results   Component Value Date    RBC 5.81 (H) 07/21/2021    HGB 12.4 (L) 07/21/2021    MCV 73.0 (L) 07/21/2021    MCH 21.3 (L) 07/21/2021    MCHC 29.2 07/21/2021    RDW 21.2 (H) 07/21/2021    MPV 10.2 07/21/2021    BASOPCT 1 09/10/2017    LYMPHSABS 1.40 09/10/2017    MONOSABS 0.90 09/10/2017    NEUTROABS 9.20 (H) 09/10/2017    EOSABS 0.00 09/10/2017    BASOSABS 0.10 09/10/2017         DIAGNOSTIC TESTING:     No results found. PHYSICAL EXAMINATION: Vital signs reviewed per the nursing documentation. BP (!) 143/90   Wt 199 lb (90.3 kg)   BMI 30.26 kg/m²   Body mass index is 30.26 kg/m². Physical Exam  Vitals and nursing note reviewed. Constitutional:       General: He is not in acute distress. Appearance: He is well-developed. He is not diaphoretic.    HENT:      Head: Normocephalic and atraumatic. Eyes:      General: No scleral icterus. Pupils: Pupils are equal, round, and reactive to light. Neck:      Thyroid: No thyromegaly. Vascular: No JVD. Trachea: No tracheal deviation. Cardiovascular:      Rate and Rhythm: Normal rate and regular rhythm. Heart sounds: Normal heart sounds. No murmur heard. Pulmonary:      Effort: Pulmonary effort is normal. No respiratory distress. Breath sounds: Normal breath sounds. No wheezing. Abdominal:      General: Bowel sounds are normal. There is no distension. Palpations: Abdomen is soft. There is no mass. Tenderness: There is no abdominal tenderness. There is no guarding or rebound. Musculoskeletal:         General: No tenderness. Normal range of motion. Cervical back: Normal range of motion and neck supple. Skin:     General: Skin is warm. Coloration: Skin is not pale. Findings: No erythema or rash. Comments: He is not diaphoretic   Neurological:      Mental Status: He is alert and oriented to person, place, and time. Deep Tendon Reflexes: Reflexes are normal and symmetric. Psychiatric:         Behavior: Behavior normal.         Thought Content: Thought content normal.         Judgment: Judgment normal.           IMPRESSION: Mr. Krish Richard is a 45 y.o. male with      Diagnosis Orders   1. Adenoma of sigmoid colon     2. Diverticulosis     3. Gastroesophageal reflux disease, unspecified whether esophagitis present     We will keep a close eye on this patient with history of large adenoma polyps repeat his colonoscopy in 1 or 2 years      Diet/life style/natural hx /complication of the dx were all explained in details   Past medical, past surgical, social history, psychiatric history, medications or allergies, all reviewed and  updated    Thank you for allowing me to participate in the care of Mr. Krish Richard. For any further questions please do not hesitate to contact me.     I have reviewed and agree with the ROS entered by the MA/RN. Note is dictated utilizing voice recognition software. Unfortunately this leads to occasional typographical errors. Please contact our office if you have any questions.       Yaz Graham MD  [facility]  O: #[phone]

## 2022-10-18 ENCOUNTER — OFFICE VISIT (OUTPATIENT)
Dept: FAMILY MEDICINE CLINIC | Age: 52
End: 2022-10-18
Payer: COMMERCIAL

## 2022-10-18 ENCOUNTER — HOSPITAL ENCOUNTER (OUTPATIENT)
Dept: GENERAL RADIOLOGY | Age: 52
Discharge: HOME | End: 2022-10-20
Payer: COMMERCIAL

## 2022-10-18 ENCOUNTER — HOSPITAL ENCOUNTER (OUTPATIENT)
Age: 52
Discharge: HOME | End: 2022-10-20
Payer: COMMERCIAL

## 2022-10-18 VITALS
WEIGHT: 195 LBS | TEMPERATURE: 97.9 F | BODY MASS INDEX: 29.55 KG/M2 | OXYGEN SATURATION: 99 % | DIASTOLIC BLOOD PRESSURE: 85 MMHG | SYSTOLIC BLOOD PRESSURE: 136 MMHG | HEIGHT: 68 IN | HEART RATE: 81 BPM

## 2022-10-18 DIAGNOSIS — R07.81 RIB PAIN ON LEFT SIDE: ICD-10-CM

## 2022-10-18 DIAGNOSIS — M94.0 COSTOCHONDRITIS: Primary | ICD-10-CM

## 2022-10-18 PROCEDURE — 99213 OFFICE O/P EST LOW 20 MIN: CPT

## 2022-10-18 PROCEDURE — 71101 X-RAY EXAM UNILAT RIBS/CHEST: CPT

## 2022-10-18 ASSESSMENT — ENCOUNTER SYMPTOMS
RESPIRATORY NEGATIVE: 1
BLOOD IN STOOL: 0
SORE THROAT: 0
SPUTUM PRODUCTION: 0
VOMITING: 0
VOICE CHANGE: 0
WHEEZING: 0
ANAL BLEEDING: 0
CHOKING: 0
EYE ITCHING: 0
ABDOMINAL DISTENTION: 0
BACK PAIN: 0
RHINORRHEA: 0
EYE REDNESS: 0
NAUSEA: 0
HEMOPTYSIS: 0
STRIDOR: 0
COUGH: 0
EYE PAIN: 0
GASTROINTESTINAL NEGATIVE: 1
CONSTIPATION: 0
DIARRHEA: 0
FACIAL SWELLING: 0
CHEST TIGHTNESS: 0
ORTHOPNEA: 0
SHORTNESS OF BREATH: 0
EYES NEGATIVE: 1
SINUS PAIN: 0
TROUBLE SWALLOWING: 0
COLOR CHANGE: 0
RECTAL PAIN: 0
ABDOMINAL PAIN: 0
SINUS PRESSURE: 0
PHOTOPHOBIA: 0
APNEA: 0
EYE DISCHARGE: 0

## 2022-10-18 NOTE — PROGRESS NOTES
[facility]  [address]  [place] [facility]  [place] [address] [facility] [address] [address]  Dept: [phone]    Good Salazar is a 47 y.o. male Established patient, who presents to the walk-in clinic today with conditions/complaints as noted below:    Chief Complaint   Patient presents with    Rib Pain (injury)     Patient is here c/o of rib pain on his left side that starts going into his sternum. This happens when he inhales deeply and if he bends down. This has been going on for about a year. But this time around a couple weeks. HPI:     Chest Pain   This is a new problem. Episode onset: 2 weeks ago. The onset quality is gradual. The problem occurs constantly. The problem has been gradually worsening. The pain is present in the lateral region. The pain is moderate (with certain movements). The quality of the pain is described as sharp. The pain does not radiate. Pertinent negatives include no abdominal pain, back pain, claudication, cough, diaphoresis, dizziness, exertional chest pressure, fever, headaches, hemoptysis, irregular heartbeat, leg pain, lower extremity edema, malaise/fatigue, nausea, near-syncope, numbness, orthopnea, palpitations, PND, shortness of breath, sputum production, syncope, vomiting or weakness. Pertinent negatives for past medical history include no seizures. Front left rib, midclavicular line and posterior at times when breathing in and bending down. He reports he has brought this up with PCP and that she is suspicious this was an inflamed cartilage.    Past Medical History:   Diagnosis Date    Gout 2004    Hyperlipidemia     Sleep apnea     has cpap  3-24-22 -not at this time cpap machine recalled       Current Outpatient Medications   Medication Sig Dispense Refill atorvastatin (LIPITOR) 40 MG tablet Take 1 tablet by mouth daily 90 tablet 3    anastrozole (ARIMIDEX) 1 MG tablet Take 1 mg by mouth Weekly on Mondays      Cholecalciferol (VITAMIN D3) 1.25 MG (30585 UT) CAPS Take 1 capsule by mouth once a week Monday      NONFORMULARY Take 25 mg by mouth Twice a Week Monday & Thursday (Enclomiphene Citrate)      colchicine (COLCRYS) 0.6 MG tablet take 1 tablet by mouth twice a day AS NEEDED FOR PAIN 60 tablet 1     No current facility-administered medications for this visit. No Known Allergies    Review of Systems:     Review of Systems   Constitutional: Negative. Negative for activity change, appetite change, chills, diaphoresis, fatigue, fever, malaise/fatigue and unexpected weight change. HENT: Negative. Negative for congestion, dental problem, drooling, ear discharge, ear pain, facial swelling, hearing loss, mouth sores, nosebleeds, postnasal drip, rhinorrhea, sinus pressure, sinus pain, sneezing, sore throat, tinnitus, trouble swallowing and voice change. Eyes: Negative. Negative for photophobia, pain, discharge, redness, itching and visual disturbance. Respiratory: Negative. Negative for apnea, cough, hemoptysis, sputum production, choking, chest tightness, shortness of breath, wheezing and stridor. Cardiovascular: Negative. Negative for chest pain, palpitations, orthopnea, claudication, leg swelling, syncope, PND and near-syncope. Gastrointestinal: Negative. Negative for abdominal distention, abdominal pain, anal bleeding, blood in stool, constipation, diarrhea, nausea, rectal pain and vomiting. Musculoskeletal:  Negative for arthralgias, back pain, gait problem, joint swelling, myalgias, neck pain and neck stiffness. Left rib pain     Skin: Negative. Negative for color change, pallor, rash and wound. Neurological: Negative.   Negative for dizziness, tremors, seizures, syncope, facial asymmetry, speech difficulty, weakness, light-headedness, numbness and headaches. Physical Exam:      /85   Pulse 81   Temp 97.9 °F (36.6 °C) (Temporal)   Ht 5' 8\" (1.727 m)   Wt 195 lb (88.5 kg)   SpO2 99%   BMI 29.65 kg/m²     Physical Exam  Vitals reviewed. Constitutional:       Appearance: Normal appearance. He is normal weight. HENT:      Head: Normocephalic and atraumatic. Right Ear: Tympanic membrane, ear canal and external ear normal.      Left Ear: Tympanic membrane, ear canal and external ear normal.      Nose: Nose normal.      Mouth/Throat:      Lips: Pink. Mouth: Mucous membranes are moist.      Pharynx: Oropharynx is clear. Uvula midline. Eyes:      Extraocular Movements: Extraocular movements intact. Conjunctiva/sclera: Conjunctivae normal.      Pupils: Pupils are equal, round, and reactive to light. Cardiovascular:      Rate and Rhythm: Normal rate and regular rhythm. Pulses: Normal pulses. Heart sounds: Normal heart sounds. Pulmonary:      Effort: Pulmonary effort is normal.      Breath sounds: Normal breath sounds and air entry. Abdominal:      General: Abdomen is flat. Bowel sounds are normal.      Palpations: Abdomen is soft. Musculoskeletal:         General: Normal range of motion. Arms:       Cervical back: Normal range of motion and neck supple. Comments: Tenderness was reproducible of left midclavicular line below the left breast with inspiration and bending. Not elicited with palpation. Skin:     General: Skin is warm and dry. Capillary Refill: Capillary refill takes less than 2 seconds. Neurological:      General: No focal deficit present. Mental Status: He is alert and oriented to person, place, and time. Mental status is at baseline. Psychiatric:         Mood and Affect: Mood normal.         Behavior: Behavior normal.       Plan:          1. Costochondritis  2. Rib pain on left side  -     XR RIBS LEFT INCLUDE CHEST (MIN 3 VIEWS);  Future Narrative   EXAMINATION:   4 XRAY VIEWS OF THE LEFT RIBS WITH FRONTAL XRAY VIEW OF THE CHEST       10/18/2022 10:18 am       COMPARISON:   None. HISTORY:   ORDERING SYSTEM PROVIDED HISTORY: Rib pain on left side   TECHNOLOGIST PROVIDED HISTORY:   left rib pains with bending and breathing in X1 year, NKI   Reason for Exam: left rib pains with bending and breathing in X1 year, NKI       FINDINGS:   Chest: Cardiomediastinal silhouette within normal limits. No acute airspace   disease. No pneumothorax. No pleural effusion. Left ribs: No fracture           Impression   No acute abnormalities seen in the chest or left ribs         Continue over-the-counter medications as needed for symptoms like NSAIDS, Motrin or Naproxen. Go to the ER for shortness of breath, chest pain. Patient verbalized understanding. Follow Up Instructions:      Return if symptoms worsen or fail to improve, for SOB, chest pain go to ER. No orders of the defined types were placed in this encounter. Patient and/or parent given educational materials - see patient instructions. Discussed use, benefit, and side effects of prescribed medications. All patient questions answered. Patient and/or parent voiced understanding.       Electronically signed by ALEXANDRE Capps 10/18/2022 at 3:34 PM

## 2022-10-19 ENCOUNTER — TELEPHONE (OUTPATIENT)
Dept: FAMILY MEDICINE CLINIC | Age: 52
End: 2022-10-19

## 2022-10-19 NOTE — TELEPHONE ENCOUNTER
Pt called and stated that he would like to have the CT ordered if possible. Pt stated that he woke up in quite a bit of pain.

## 2022-11-01 ENCOUNTER — OFFICE VISIT (OUTPATIENT)
Dept: UROLOGY | Age: 52
End: 2022-11-01
Payer: COMMERCIAL

## 2022-11-01 VITALS — TEMPERATURE: 98 F | BODY MASS INDEX: 29.55 KG/M2 | HEIGHT: 68 IN | WEIGHT: 195 LBS

## 2022-11-01 DIAGNOSIS — Z12.5 PROSTATE CANCER SCREENING: Primary | ICD-10-CM

## 2022-11-01 PROCEDURE — 99212 OFFICE O/P EST SF 10 MIN: CPT | Performed by: UROLOGY

## 2022-11-01 ASSESSMENT — ENCOUNTER SYMPTOMS
NAUSEA: 0
EYE REDNESS: 0
SHORTNESS OF BREATH: 0
DIARRHEA: 0
EYE PAIN: 0
CONSTIPATION: 0
BACK PAIN: 0
VOMITING: 0
COUGH: 0
ABDOMINAL PAIN: 0
WHEEZING: 0

## 2022-11-01 NOTE — LETTER
[address] [place] [address]  Dept: [phone]  Dept Fax: [phone]        11/1/22    Patient: Garwin Homans  YOB: 1970    Dear Raina Pelayo, APRN - CNP,    I had the pleasure of seeing one of your patients, Ramon Glass today in the office today. Below are the relevant portions of my assessment and plan of care. IMPRESSION:  1. Prostate cancer screening        PLAN:  Doing well  PSA has normalized. Will repeat PSA I November. Return in about 1 year (around 11/1/2023) for Labs. Prescriptions Ordered:  No orders of the defined types were placed in this encounter. Orders Placed:  Orders Placed This Encounter   Procedures    PSA Screening     Standing Status:   Future     Standing Expiration Date:   11/1/2023          Thank you for allowing me to participate in the care of this patient. I will keep you updated on this patient's follow up and I look forward to serving you and your patients again in the future.         Margy Lezama MD
Alert and oriented, no focal deficits, no motor or sensory deficits.

## 2022-11-01 NOTE — PROGRESS NOTES
[address] [address]  Dept: [phone] [facility] Urology Office Note - Established    Patient:  Good Salazar  YOB: 1970  Date: 11/1/2022    The patient is a 47 y.o. male who presents todayfor evaluation of the following problems:   Chief Complaint   Patient presents with    Elevated PSA     Takes a supplement before working, thinks that could be the cause of elevated PSA results        HPI  He was seen for elevated PSA previously. His PSA had been going up and is now down. He was taking a supplement, which he thought was making his PSA go up because it makes him urinate a lot. No FHx of prostate cancer. Summary of old records: N/A    Additional History: N/A    Procedures Today: N/A    Urinalysis today:  No results found for this visit on 11/01/22. Last several PSA's:  Lab Results   Component Value Date    PSA 1.72 11/23/2021    PSA 1.25 11/20/2020    PSA 3.52 05/20/2020     Last total testosterone:  No results found for: TESTOSTERONE    AUA Symptom Score (11/1/2022):                                Last BUN and creatinine:  Lab Results   Component Value Date    BUN 8 07/21/2021     Lab Results   Component Value Date    CREATININE 1.10 07/21/2021       Additional Lab/Culture results: none    Imaging Reviewed during this Office Visit: none  (results were independently reviewed by physician and radiology report verified)    PAST MEDICAL, FAMILY AND SOCIAL HISTORY UPDATE:  Past Medical History:   Diagnosis Date    Gout 2004    Hyperlipidemia     Sleep apnea     has cpap  3-24-22 -not at this time cpap machine recalled     Past Surgical History:   Procedure Laterality Date    COLONOSCOPY N/A 12/30/2021    COLONOSCOPY POLYPECTOMY SNARE/COLD BIOPSY performed by Tawnya Verduzco MD at [address] 2/10/2022    COLONOSCOPY POLYPECTOMY HOT BIOPSY WITH CLIP PLACEMENT X3 performed by Percy Thomas MD at [address] [facility] N/A 4/7/2022    [address] [facility] performed by Percy Thomas MD at  [address]       Family History   Problem Relation Age of Onset    High Blood Pressure Mother     High Cholesterol Father     High Blood Pressure Father     Diabetes Father     Other Maternal Grandmother         gout     Cancer Maternal Grandfather     Alzheimer's Disease Paternal Grandmother     Dementia Paternal Grandmother     Diabetes Paternal Grandfather     Heart Disease Paternal Grandfather      Outpatient Medications Marked as Taking for the 11/1/22 encounter (Office Visit) with Charlene Hook MD   Medication Sig Dispense Refill    atorvastatin (LIPITOR) 40 MG tablet Take 1 tablet by mouth daily 90 tablet 3    anastrozole (ARIMIDEX) 1 MG tablet Take 1 mg by mouth Weekly on Mondays      Cholecalciferol (VITAMIN D3) 1.25 MG (49077 UT) CAPS Take 1 capsule by mouth once a week Monday      NONFORMULARY Take 25 mg by mouth Twice a Week Monday & Thursday (Enclomiphene Citrate)         Patient has no known allergies. Social History     Tobacco Use   Smoking Status Former    Packs/day: 0.25    Years: 15.00    Pack years: 3.75    Types: Cigarettes   Smokeless Tobacco Never     (Ifpatient a smoker, smoking cessation counseling offered)    Social History     Substance and Sexual Activity   Alcohol Use Yes    Comment: weekends       REVIEW OF SYSTEMS:  Review of Systems    Physical Exam:      Vitals:    11/01/22 0959   Temp: 98 °F (36.7 °C)     Body mass index is 29.65 kg/m². Patient is a 47 y.o. male in no acute distress and alert and oriented to person, place and time. Physical Exam  Constitutional: Patient in no acute distress. Neuro: Alert and oriented to person, place and time.   Psych: Mood normal, affect normal  Lungs: Respiratory effort is normal  Cardiovascular: Warm & Pink  Abdomen: Soft, non-tender, non-distended with no CVA,  No flank tenderness,  Or hepatosplenomegaly   Lymphatics: No palpablelymphadenopathy. Bladder non-tender and not distended. Assessment and Plan      1. Prostate cancer screening           Plan:         Doing well  PSA has normalized. Will repeat PSA I November. Return in about 1 year (around 11/1/2023) for Labs. Prescriptions Ordered:  No orders of the defined types were placed in this encounter. Orders Placed:  Orders Placed This Encounter   Procedures    PSA Screening     Standing Status:   Future     Standing Expiration Date:   11/1/2023             Melia Harrison MD    Agree with the ROS entered by the MA.

## 2022-11-02 ENCOUNTER — HOSPITAL ENCOUNTER (OUTPATIENT)
Dept: CT IMAGING | Age: 52
Discharge: HOME | End: 2022-11-04
Payer: COMMERCIAL

## 2022-11-02 DIAGNOSIS — R07.81 RIB PAIN ON LEFT SIDE: ICD-10-CM

## 2022-11-02 DIAGNOSIS — K43.9 VENTRAL HERNIA WITHOUT OBSTRUCTION OR GANGRENE: ICD-10-CM

## 2022-11-02 PROCEDURE — 71250 CT THORAX DX C-: CPT

## 2022-11-28 ENCOUNTER — HOSPITAL ENCOUNTER (OUTPATIENT)
Age: 52
Setting detail: SPECIMEN
Discharge: HOME | End: 2022-11-28

## 2022-11-28 DIAGNOSIS — E78.00 PURE HYPERCHOLESTEROLEMIA: ICD-10-CM

## 2022-11-28 DIAGNOSIS — Z12.5 PROSTATE CANCER SCREENING: ICD-10-CM

## 2022-11-28 DIAGNOSIS — R74.8 ELEVATED LIVER ENZYMES: ICD-10-CM

## 2022-11-28 LAB
ABSOLUTE EOS #: <0.03 K/UL (ref 0–0.44)
ABSOLUTE IMMATURE GRANULOCYTE: <0.03 K/UL (ref 0–0.3)
ABSOLUTE LYMPH #: 1.59 K/UL (ref 1.1–3.7)
ABSOLUTE MONO #: 0.74 K/UL (ref 0.1–1.2)
ALBUMIN SERPL-MCNC: 4.4 G/DL (ref 3.5–5.2)
ALBUMIN/GLOBULIN RATIO: 1.7 (ref 1–2.5)
ALP BLD-CCNC: 87 U/L (ref 40–129)
ALT SERPL-CCNC: 54 U/L (ref 5–41)
ANION GAP SERPL CALCULATED.3IONS-SCNC: 14 MMOL/L (ref 9–17)
AST SERPL-CCNC: 36 U/L
BASOPHILS # BLD: 1 % (ref 0–2)
BASOPHILS ABSOLUTE: 0.05 K/UL (ref 0–0.2)
BILIRUB SERPL-MCNC: 0.8 MG/DL (ref 0.3–1.2)
BUN BLDV-MCNC: 12 MG/DL (ref 6–20)
CALCIUM SERPL-MCNC: 9.5 MG/DL (ref 8.6–10.4)
CHLORIDE BLD-SCNC: 105 MMOL/L (ref 98–107)
CHOLESTEROL/HDL RATIO: 3.1
CHOLESTEROL: 149 MG/DL
CO2: 24 MMOL/L (ref 20–31)
CREAT SERPL-MCNC: 1.16 MG/DL (ref 0.7–1.2)
EOSINOPHILS RELATIVE PERCENT: 0 % (ref 1–4)
GFR SERPL CREATININE-BSD FRML MDRD: >60 ML/MIN/1.73M2
GLUCOSE BLD-MCNC: 81 MG/DL (ref 70–99)
HCT VFR BLD CALC: 50.1 % (ref 40.7–50.3)
HDLC SERPL-MCNC: 48 MG/DL
HEMOGLOBIN: 15.9 G/DL (ref 13–17)
IMMATURE GRANULOCYTES: 0 %
LDL CHOLESTEROL: 81 MG/DL (ref 0–130)
LYMPHOCYTES # BLD: 23 % (ref 24–43)
MCH RBC QN AUTO: 25.5 PG (ref 25.2–33.5)
MCHC RBC AUTO-ENTMCNC: 31.7 G/DL (ref 28.4–34.8)
MCV RBC AUTO: 80.4 FL (ref 82.6–102.9)
MONOCYTES # BLD: 11 % (ref 3–12)
NRBC AUTOMATED: 0 PER 100 WBC
PDW BLD-RTO: 19.8 % (ref 11.8–14.4)
PLATELET # BLD: 257 K/UL (ref 138–453)
PMV BLD AUTO: 11.2 FL (ref 8.1–13.5)
POTASSIUM SERPL-SCNC: 4.9 MMOL/L (ref 3.7–5.3)
PROSTATE SPECIFIC ANTIGEN: 1.56 NG/ML
RBC # BLD: 6.23 M/UL (ref 4.21–5.77)
RBC # BLD: ABNORMAL 10*6/UL
SEG NEUTROPHILS: 65 % (ref 36–65)
SEGMENTED NEUTROPHILS ABSOLUTE COUNT: 4.46 K/UL (ref 1.5–8.1)
SODIUM BLD-SCNC: 143 MMOL/L (ref 135–144)
TOTAL PROTEIN: 7 G/DL (ref 6.4–8.3)
TRIGL SERPL-MCNC: 102 MG/DL
VITAMIN D 25-HYDROXY: 66.3 NG/ML
WBC # BLD: 6.9 K/UL (ref 3.5–11.3)

## 2022-11-29 LAB
ESTIMATED AVERAGE GLUCOSE: 120 MG/DL
HBA1C MFR BLD: 5.8 % (ref 4–6)

## 2023-06-23 ENCOUNTER — HOSPITAL ENCOUNTER (OUTPATIENT)
Dept: PREADMISSION TESTING | Age: 53
Discharge: HOME | End: 2023-06-27

## 2023-06-23 VITALS — HEIGHT: 68 IN | BODY MASS INDEX: 28.19 KG/M2 | WEIGHT: 186 LBS

## 2023-06-26 ENCOUNTER — ANESTHESIA EVENT (OUTPATIENT)
Dept: ENDOSCOPY | Age: 53
End: 2023-06-26
Payer: COMMERCIAL

## 2023-06-27 ENCOUNTER — ANESTHESIA (OUTPATIENT)
Dept: ENDOSCOPY | Age: 53
End: 2023-06-27
Payer: COMMERCIAL

## 2023-06-27 ENCOUNTER — HOSPITAL ENCOUNTER (OUTPATIENT)
Age: 53
Setting detail: OUTPATIENT SURGERY
Discharge: HOME | End: 2023-06-27
Attending: INTERNAL MEDICINE | Admitting: INTERNAL MEDICINE
Payer: COMMERCIAL

## 2023-06-27 VITALS
OXYGEN SATURATION: 97 % | HEIGHT: 68 IN | DIASTOLIC BLOOD PRESSURE: 53 MMHG | SYSTOLIC BLOOD PRESSURE: 105 MMHG | BODY MASS INDEX: 28.19 KG/M2 | HEART RATE: 51 BPM | WEIGHT: 186 LBS | RESPIRATION RATE: 15 BRPM | TEMPERATURE: 97.9 F

## 2023-06-27 DIAGNOSIS — Z86.010 HISTORY OF COLON POLYPS: ICD-10-CM

## 2023-06-27 PROCEDURE — 2580000003 HC RX 258: Performed by: ANESTHESIOLOGY

## 2023-06-27 PROCEDURE — 3609010600 HC COLONOSCOPY POLYPECTOMY SNARE/COLD BIOPSY: Performed by: INTERNAL MEDICINE

## 2023-06-27 PROCEDURE — 45380 COLONOSCOPY AND BIOPSY: CPT | Performed by: INTERNAL MEDICINE

## 2023-06-27 PROCEDURE — 2709999900 HC NON-CHARGEABLE SUPPLY: Performed by: INTERNAL MEDICINE

## 2023-06-27 PROCEDURE — 3700000000 HC ANESTHESIA ATTENDED CARE: Performed by: INTERNAL MEDICINE

## 2023-06-27 PROCEDURE — 3700000001 HC ADD 15 MINUTES (ANESTHESIA): Performed by: INTERNAL MEDICINE

## 2023-06-27 PROCEDURE — 6360000002 HC RX W HCPCS: Performed by: NURSE ANESTHETIST, CERTIFIED REGISTERED

## 2023-06-27 PROCEDURE — 88305 TISSUE EXAM BY PATHOLOGIST: CPT

## 2023-06-27 PROCEDURE — 7100000010 HC PHASE II RECOVERY - FIRST 15 MIN: Performed by: INTERNAL MEDICINE

## 2023-06-27 PROCEDURE — 45385 COLONOSCOPY W/LESION REMOVAL: CPT | Performed by: INTERNAL MEDICINE

## 2023-06-27 PROCEDURE — 7100000011 HC PHASE II RECOVERY - ADDTL 15 MIN: Performed by: INTERNAL MEDICINE

## 2023-06-27 PROCEDURE — 2500000003 HC RX 250 WO HCPCS: Performed by: NURSE ANESTHETIST, CERTIFIED REGISTERED

## 2023-06-27 RX ORDER — SODIUM CHLORIDE 0.9 % (FLUSH) 0.9 %
5-40 SYRINGE (ML) INJECTION EVERY 12 HOURS SCHEDULED
Status: DISCONTINUED | OUTPATIENT
Start: 2023-06-27 | End: 2023-06-27 | Stop reason: HOSPADM

## 2023-06-27 RX ORDER — LIDOCAINE HYDROCHLORIDE 10 MG/ML
1 INJECTION, SOLUTION EPIDURAL; INFILTRATION; INTRACAUDAL; PERINEURAL
Status: DISCONTINUED | OUTPATIENT
Start: 2023-06-27 | End: 2023-06-27 | Stop reason: HOSPADM

## 2023-06-27 RX ORDER — SODIUM CHLORIDE, SODIUM LACTATE, POTASSIUM CHLORIDE, CALCIUM CHLORIDE 600; 310; 30; 20 MG/100ML; MG/100ML; MG/100ML; MG/100ML
INJECTION, SOLUTION INTRAVENOUS CONTINUOUS
Status: DISCONTINUED | OUTPATIENT
Start: 2023-06-27 | End: 2023-06-27 | Stop reason: HOSPADM

## 2023-06-27 RX ORDER — SODIUM CHLORIDE 9 MG/ML
INJECTION, SOLUTION INTRAVENOUS PRN
Status: DISCONTINUED | OUTPATIENT
Start: 2023-06-27 | End: 2023-06-27 | Stop reason: HOSPADM

## 2023-06-27 RX ORDER — PROPOFOL 10 MG/ML
INJECTION, EMULSION INTRAVENOUS PRN
Status: DISCONTINUED | OUTPATIENT
Start: 2023-06-27 | End: 2023-06-27 | Stop reason: SDUPTHER

## 2023-06-27 RX ORDER — LIDOCAINE HYDROCHLORIDE 20 MG/ML
INJECTION, SOLUTION EPIDURAL; INFILTRATION; INTRACAUDAL; PERINEURAL PRN
Status: DISCONTINUED | OUTPATIENT
Start: 2023-06-27 | End: 2023-06-27 | Stop reason: SDUPTHER

## 2023-06-27 RX ORDER — SODIUM CHLORIDE 0.9 % (FLUSH) 0.9 %
5-40 SYRINGE (ML) INJECTION PRN
Status: DISCONTINUED | OUTPATIENT
Start: 2023-06-27 | End: 2023-06-27 | Stop reason: HOSPADM

## 2023-06-27 RX ADMIN — PROPOFOL 50 MG: 10 INJECTION, EMULSION INTRAVENOUS at 09:25

## 2023-06-27 RX ADMIN — PROPOFOL 50 MG: 10 INJECTION, EMULSION INTRAVENOUS at 09:56

## 2023-06-27 RX ADMIN — SODIUM CHLORIDE, POTASSIUM CHLORIDE, SODIUM LACTATE AND CALCIUM CHLORIDE: 600; 310; 30; 20 INJECTION, SOLUTION INTRAVENOUS at 08:06

## 2023-06-27 RX ADMIN — PROPOFOL 125 MG: 10 INJECTION, EMULSION INTRAVENOUS at 09:22

## 2023-06-27 RX ADMIN — LIDOCAINE HYDROCHLORIDE 40 MG: 20 INJECTION, SOLUTION EPIDURAL; INFILTRATION; INTRACAUDAL; PERINEURAL at 09:22

## 2023-06-27 ASSESSMENT — PAIN - FUNCTIONAL ASSESSMENT: PAIN_FUNCTIONAL_ASSESSMENT: 0-10

## 2023-06-27 ASSESSMENT — ENCOUNTER SYMPTOMS
COUGH: 0
BACK PAIN: 0
SORE THROAT: 0
SHORTNESS OF BREATH: 0
WHEEZING: 0

## 2023-06-28 LAB — SURGICAL PATHOLOGY REPORT: NORMAL

## 2023-11-29 ENCOUNTER — HOSPITAL ENCOUNTER (OUTPATIENT)
Age: 53
Setting detail: SPECIMEN
Discharge: HOME | End: 2023-11-29

## 2023-11-29 DIAGNOSIS — Z12.5 PROSTATE CANCER SCREENING: ICD-10-CM

## 2023-11-29 DIAGNOSIS — R74.8 ELEVATED LIVER ENZYMES: ICD-10-CM

## 2023-11-29 DIAGNOSIS — L65.9 HAIR LOSS: ICD-10-CM

## 2023-11-29 LAB
ALBUMIN SERPL-MCNC: 4.3 G/DL (ref 3.5–5.2)
ALBUMIN/GLOB SERPL: 1.7 {RATIO} (ref 1–2.5)
ALP SERPL-CCNC: 91 U/L (ref 40–129)
ALT SERPL-CCNC: 38 U/L (ref 5–41)
ANION GAP SERPL CALCULATED.3IONS-SCNC: 10 MMOL/L (ref 9–17)
AST SERPL-CCNC: 31 U/L
BILIRUB SERPL-MCNC: 0.9 MG/DL (ref 0.3–1.2)
BUN SERPL-MCNC: 12 MG/DL (ref 6–20)
CALCIUM SERPL-MCNC: 9.5 MG/DL (ref 8.6–10.4)
CHLORIDE SERPL-SCNC: 107 MMOL/L (ref 98–107)
CO2 SERPL-SCNC: 28 MMOL/L (ref 20–31)
CREAT SERPL-MCNC: 1 MG/DL (ref 0.7–1.2)
ERYTHROCYTE [DISTWIDTH] IN BLOOD BY AUTOMATED COUNT: 12.5 % (ref 11.8–14.4)
GFR SERPL CREATININE-BSD FRML MDRD: >60 ML/MIN/1.73M2
GLUCOSE SERPL-MCNC: 92 MG/DL (ref 70–99)
HCT VFR BLD AUTO: 40.6 % (ref 40.7–50.3)
HGB BLD-MCNC: 14 G/DL (ref 13–17)
MCH RBC QN AUTO: 31.3 PG (ref 25.2–33.5)
MCHC RBC AUTO-ENTMCNC: 34.5 G/DL (ref 28.4–34.8)
MCV RBC AUTO: 90.6 FL (ref 82.6–102.9)
NRBC BLD-RTO: 0 PER 100 WBC
PHOSPHATE SERPL-MCNC: 3.2 MG/DL (ref 2.5–4.5)
PLATELET # BLD AUTO: 214 K/UL (ref 138–453)
PMV BLD AUTO: 10.8 FL (ref 8.1–13.5)
POTASSIUM SERPL-SCNC: 4.6 MMOL/L (ref 3.7–5.3)
PROT SERPL-MCNC: 6.9 G/DL (ref 6.4–8.3)
PSA SERPL-MCNC: 1.14 NG/ML
RBC # BLD AUTO: 4.48 M/UL (ref 4.21–5.77)
SODIUM SERPL-SCNC: 145 MMOL/L (ref 135–144)
WBC OTHER # BLD: 5.9 K/UL (ref 3.5–11.3)

## 2024-10-17 ENCOUNTER — TELEPHONE (OUTPATIENT)
Dept: UROLOGY | Age: 54
End: 2024-10-17

## 2024-10-17 NOTE — TELEPHONE ENCOUNTER
Patient called in and left a voicemail asking for a call back. Writer called patient back and left a voicemail asking for a call back.

## 2024-11-01 ENCOUNTER — OFFICE VISIT (OUTPATIENT)
Dept: UROLOGY | Age: 54
End: 2024-11-01
Payer: COMMERCIAL

## 2024-11-01 VITALS
OXYGEN SATURATION: 95 % | HEIGHT: 68 IN | HEART RATE: 83 BPM | BODY MASS INDEX: 29.25 KG/M2 | DIASTOLIC BLOOD PRESSURE: 84 MMHG | WEIGHT: 193 LBS | SYSTOLIC BLOOD PRESSURE: 122 MMHG

## 2024-11-01 DIAGNOSIS — R68.82 LOW LIBIDO: Primary | ICD-10-CM

## 2024-11-01 DIAGNOSIS — Z12.5 PROSTATE CANCER SCREENING: ICD-10-CM

## 2024-11-01 DIAGNOSIS — R79.89 LOW TESTOSTERONE: ICD-10-CM

## 2024-11-01 PROCEDURE — 99214 OFFICE O/P EST MOD 30 MIN: CPT | Performed by: UROLOGY

## 2024-11-01 RX ORDER — TESTOSTERONE 16.2 MG/G
1 GEL TRANSDERMAL DAILY
Qty: 1 EACH | Refills: 5 | Status: SHIPPED | OUTPATIENT
Start: 2024-11-01 | End: 2024-12-01

## 2024-11-01 ASSESSMENT — ENCOUNTER SYMPTOMS
GASTROINTESTINAL NEGATIVE: 1
RESPIRATORY NEGATIVE: 1

## 2024-11-01 NOTE — PROGRESS NOTES
Review of Systems   Constitutional: Negative.    HENT: Negative.     Respiratory: Negative.     Cardiovascular: Negative.    Gastrointestinal: Negative.    Genitourinary:  Negative for difficulty urinating, dysuria, frequency, hematuria and urgency.

## 2024-11-01 NOTE — PROGRESS NOTES
[facility]  [address]  [facility] [address]  Dept: [phone]    [facility] Urology Office Note - Established    Patient:  Grabiel Trevino  YOB: 1970  Date: 11/1/2024    The patient is a 54 y.o. male who presents todayfor evaluation of the following problems:   Chief Complaint   Patient presents with    Follow-up     Discuss testosterone replacement         HPI  Here for prostate cancer screening.   PSA had been elevated in the past, and has normalized for the last few years.   He had been on T in the past, but stopped using it last year.   His last T was 336.  Most recent PSA is 2.26.        Summary of old records: N/A    Additional History: N/A    Procedures Today: N/A    Urinalysis today:  No results found for this visit on 11/01/24.  Last several PSA's:  Lab Results   Component Value Date    PSA 1.14 11/29/2023    PSA 1.56 11/28/2022    PSA 1.72 11/23/2021     Last total testosterone:  Lab Results   Component Value Date    TESTOSTERONE 334 07/23/2024       AUA Symptom Score (11/1/2024):                               Last BUN and creatinine:  Lab Results   Component Value Date    BUN 10 07/23/2024     Lab Results   Component Value Date    CREATININE 1.08 07/23/2024       Additional Lab/Culture results: none    Imaging Reviewed during this Office Visit: none  (results were independently reviewed by physician and radiology report verified)    PAST MEDICAL, FAMILY AND SOCIAL HISTORY UPDATE:  Past Medical History:   Diagnosis Date    Gout 2004    Hyperlipidemia     Sleep apnea     has cpap  3-24-22 -not at this time cpap machine recalled     Past Surgical History:   Procedure Laterality Date    COLONOSCOPY N/A 12/30/2021    COLONOSCOPY POLYPECTOMY SNARE/COLD BIOPSY performed by Mel Hayes MD at [facility] ENDO    COLONOSCOPY N/A 2/10/2022    COLONOSCOPY POLYPECTOMY HOT BIOPSY WITH CLIP PLACEMENT X3 performed by Mel

## 2025-06-10 ENCOUNTER — PATIENT MESSAGE (OUTPATIENT)
Dept: GASTROENTEROLOGY | Age: 55
End: 2025-06-10

## (undated) DEVICE — FORCEPS BX L240CM WRK CHN 2.8MM STD CAP W/ NDL MIC MESH

## (undated) DEVICE — CLIP LIG L235CM RESOL 360 BX/20

## (undated) DEVICE — POLYP TRAP: Brand: TRAPEASE®

## (undated) DEVICE — ENDO KIT W/SYRINGE: Brand: MEDLINE INDUSTRIES, INC.

## (undated) DEVICE — NEEDLE SCLERO 25GA L240CM OD0.51MM ID0.24MM EXTN L4MM SHTH

## (undated) DEVICE — DEFENDO AIR WATER SUCTION AND BIOPSY VALVE KIT FOR  OLYMPUS: Brand: DEFENDO AIR/WATER/SUCTION AND BIOPSY VALVE

## (undated) DEVICE — ERBE NESSY® OMEGA PLATE USA (85+23)CM² , WITH CABLE 3 M: Brand: ERBE

## (undated) DEVICE — SNARE ENDOSCP POLYP MED STD AD 2.4X27X240 CM 2.8 MM OVL SENS

## (undated) DEVICE — SNARE ENDOSCP L240CM LOOP W27MM SHTH DIA2.4MM WRK CHN 2.8MM

## (undated) DEVICE — SNARE ENDOSCP L240CM LOOP W13MM DIA2.4MM SHT THROW SM OVL

## (undated) DEVICE — GLOVE ORANGE PI 7 1/2   MSG9075